# Patient Record
Sex: MALE | Race: WHITE
[De-identification: names, ages, dates, MRNs, and addresses within clinical notes are randomized per-mention and may not be internally consistent; named-entity substitution may affect disease eponyms.]

---

## 2020-12-16 ENCOUNTER — HOSPITAL ENCOUNTER (INPATIENT)
Dept: HOSPITAL 47 - EC | Age: 85
LOS: 2 days | Discharge: HOME HEALTH | DRG: 682 | End: 2020-12-18
Attending: HOSPITALIST | Admitting: HOSPITALIST
Payer: MEDICARE

## 2020-12-16 DIAGNOSIS — Z79.01: ICD-10-CM

## 2020-12-16 DIAGNOSIS — G93.41: ICD-10-CM

## 2020-12-16 DIAGNOSIS — E87.2: ICD-10-CM

## 2020-12-16 DIAGNOSIS — Z96.659: ICD-10-CM

## 2020-12-16 DIAGNOSIS — G30.1: ICD-10-CM

## 2020-12-16 DIAGNOSIS — I71.4: ICD-10-CM

## 2020-12-16 DIAGNOSIS — I48.19: ICD-10-CM

## 2020-12-16 DIAGNOSIS — Z79.899: ICD-10-CM

## 2020-12-16 DIAGNOSIS — G89.29: ICD-10-CM

## 2020-12-16 DIAGNOSIS — I25.2: ICD-10-CM

## 2020-12-16 DIAGNOSIS — I34.1: ICD-10-CM

## 2020-12-16 DIAGNOSIS — I10: ICD-10-CM

## 2020-12-16 DIAGNOSIS — E87.5: ICD-10-CM

## 2020-12-16 DIAGNOSIS — N17.0: Primary | ICD-10-CM

## 2020-12-16 DIAGNOSIS — E78.5: ICD-10-CM

## 2020-12-16 DIAGNOSIS — F02.80: ICD-10-CM

## 2020-12-16 DIAGNOSIS — Z79.1: ICD-10-CM

## 2020-12-16 DIAGNOSIS — Z90.81: ICD-10-CM

## 2020-12-16 DIAGNOSIS — Z87.01: ICD-10-CM

## 2020-12-16 DIAGNOSIS — Z87.891: ICD-10-CM

## 2020-12-16 DIAGNOSIS — E86.0: ICD-10-CM

## 2020-12-16 DIAGNOSIS — I25.10: ICD-10-CM

## 2020-12-16 DIAGNOSIS — Z79.82: ICD-10-CM

## 2020-12-16 DIAGNOSIS — Z20.828: ICD-10-CM

## 2020-12-16 LAB
ALBUMIN SERPL-MCNC: 3.7 G/DL (ref 3.5–5)
ALP SERPL-CCNC: 88 U/L (ref 38–126)
ALT SERPL-CCNC: 22 U/L (ref 4–49)
AMYLASE SERPL-CCNC: 67 U/L (ref 30–110)
ANION GAP SERPL CALC-SCNC: 6 MMOL/L
APTT BLD: 22.4 SEC (ref 22–30)
AST SERPL-CCNC: 46 U/L (ref 17–59)
BASOPHILS # BLD AUTO: 0.1 K/UL (ref 0–0.2)
BASOPHILS NFR BLD AUTO: 1 %
BUN SERPL-SCNC: 58 MG/DL (ref 9–20)
CALCIUM SPEC-MCNC: 9.6 MG/DL (ref 8.4–10.2)
CHLORIDE SERPL-SCNC: 98 MMOL/L (ref 98–107)
CK SERPL-CCNC: 319 U/L (ref 55–170)
CO2 SERPL-SCNC: 27 MMOL/L (ref 22–30)
DIGOXIN SERPL-MCNC: 1.5 NG/ML
EOSINOPHIL # BLD AUTO: 0.1 K/UL (ref 0–0.7)
EOSINOPHIL NFR BLD AUTO: 1 %
ERYTHROCYTE [DISTWIDTH] IN BLOOD BY AUTOMATED COUNT: 4.32 M/UL (ref 4.3–5.9)
ERYTHROCYTE [DISTWIDTH] IN BLOOD: 15.1 % (ref 11.5–15.5)
GLUCOSE SERPL-MCNC: 104 MG/DL (ref 74–99)
HCT VFR BLD AUTO: 41.2 % (ref 39–53)
HGB BLD-MCNC: 13.3 GM/DL (ref 13–17.5)
INR PPP: 1.3 (ref ?–1.2)
LIPASE SERPL-CCNC: 146 U/L (ref 23–300)
LYMPHOCYTES # SPEC AUTO: 1.1 K/UL (ref 1–4.8)
LYMPHOCYTES NFR SPEC AUTO: 14 %
MCH RBC QN AUTO: 30.8 PG (ref 25–35)
MCHC RBC AUTO-ENTMCNC: 32.3 G/DL (ref 31–37)
MCV RBC AUTO: 95.5 FL (ref 80–100)
MONOCYTES # BLD AUTO: 0.7 K/UL (ref 0–1)
MONOCYTES NFR BLD AUTO: 9 %
NEUTROPHILS # BLD AUTO: 5.8 K/UL (ref 1.3–7.7)
NEUTROPHILS NFR BLD AUTO: 73 %
PH UR: 6 [PH] (ref 5–8)
PLATELET # BLD AUTO: 348 K/UL (ref 150–450)
POTASSIUM SERPL-SCNC: 5.9 MMOL/L (ref 3.5–5.1)
PROT SERPL-MCNC: 7.7 G/DL (ref 6.3–8.2)
PT BLD: 12.7 SEC (ref 9–12)
SODIUM SERPL-SCNC: 131 MMOL/L (ref 137–145)
SP GR UR: 1.01 (ref 1–1.03)
UROBILINOGEN UR QL STRIP: <2 MG/DL (ref ?–2)
WBC # BLD AUTO: 8 K/UL (ref 3.8–10.6)
WBC # UR AUTO: 6 /HPF (ref 0–5)

## 2020-12-16 PROCEDURE — 85025 COMPLETE CBC W/AUTO DIFF WBC: CPT

## 2020-12-16 PROCEDURE — 36415 COLL VENOUS BLD VENIPUNCTURE: CPT

## 2020-12-16 PROCEDURE — 70551 MRI BRAIN STEM W/O DYE: CPT

## 2020-12-16 PROCEDURE — 99285 EMERGENCY DEPT VISIT HI MDM: CPT

## 2020-12-16 PROCEDURE — 70450 CT HEAD/BRAIN W/O DYE: CPT

## 2020-12-16 PROCEDURE — 81001 URINALYSIS AUTO W/SCOPE: CPT

## 2020-12-16 PROCEDURE — 71046 X-RAY EXAM CHEST 2 VIEWS: CPT

## 2020-12-16 PROCEDURE — 84484 ASSAY OF TROPONIN QUANT: CPT

## 2020-12-16 PROCEDURE — 94760 N-INVAS EAR/PLS OXIMETRY 1: CPT

## 2020-12-16 PROCEDURE — 94640 AIRWAY INHALATION TREATMENT: CPT

## 2020-12-16 PROCEDURE — 83690 ASSAY OF LIPASE: CPT

## 2020-12-16 PROCEDURE — 93880 EXTRACRANIAL BILAT STUDY: CPT

## 2020-12-16 PROCEDURE — 80053 COMPREHEN METABOLIC PANEL: CPT

## 2020-12-16 PROCEDURE — 82550 ASSAY OF CK (CPK): CPT

## 2020-12-16 PROCEDURE — 93005 ELECTROCARDIOGRAM TRACING: CPT

## 2020-12-16 PROCEDURE — 82150 ASSAY OF AMYLASE: CPT

## 2020-12-16 PROCEDURE — 85730 THROMBOPLASTIN TIME PARTIAL: CPT

## 2020-12-16 PROCEDURE — 85610 PROTHROMBIN TIME: CPT

## 2020-12-16 PROCEDURE — 80061 LIPID PANEL: CPT

## 2020-12-16 PROCEDURE — 87635 SARS-COV-2 COVID-19 AMP PRB: CPT

## 2020-12-16 PROCEDURE — 74176 CT ABD & PELVIS W/O CONTRAST: CPT

## 2020-12-16 PROCEDURE — 96361 HYDRATE IV INFUSION ADD-ON: CPT

## 2020-12-16 PROCEDURE — 80164 ASSAY DIPROPYLACETIC ACD TOT: CPT

## 2020-12-16 PROCEDURE — 80162 ASSAY OF DIGOXIN TOTAL: CPT

## 2020-12-16 PROCEDURE — 96360 HYDRATION IV INFUSION INIT: CPT

## 2020-12-16 PROCEDURE — 80048 BASIC METABOLIC PNL TOTAL CA: CPT

## 2020-12-16 RX ADMIN — LATANOPROST SCH: 50 SOLUTION OPHTHALMIC at 22:30

## 2020-12-16 RX ADMIN — CYCLOBENZAPRINE HYDROCHLORIDE SCH MG: 10 TABLET, FILM COATED ORAL at 22:30

## 2020-12-16 NOTE — CT
EXAMINATION TYPE: CT abdomen pelvis wo con

 

DATE OF EXAM: 12/16/2020

 

HISTORY: Weakness and pain.

 

CT DLP: 618.2 mGycm.  Automated Exposure Control for Dose Reduction was Utilized.

 

TECHNIQUE:  CT scan of the abdomen and pelvis is performed without oral or IV contrast.

 

COMPARISON: CT abdomen and pelvis December 28, 2016

 

FINDINGS:  Within the limitations of a non-contrast study, the following observations are made.

 

LUNG BASES: Moderate to severe left atrial dilatation is present. Calcification or surgical change at
 level of mitral valve redemonstrated. Mild cardiomegaly. Coronary artery calcification again seen. P
ectus deformity redemonstrated.

 

LIVER/GB: Punctate 1 to 2 mm calcifications centrally in the liver on coronal image 49 noted unchange
d from prior study.

 

PANCREAS: No significant abnormality is seen.

 

SPLEEN: Normal spleen not seen. Stable small partially calcified left upper quadrant lesion image 22 
could reflect residual splenosis.

 

ADRENALS: No significant abnormality is seen.

 

KIDNEYS: No renal stones or hydronephrosis is present bilaterally. Suprapubic catheter noted in bladd
er with moderate concentric wall thickening on current study.

 

BOWEL: Moderate amount of fecal prominence in the right and transverse colon. Prominent diverticula i
n the left and sigmoid colon. No CT evidence for acute diverticulitis. No suspicious small or large b
owel dilatation. Moderate focal fecal prominence in the rectum.

 

GENITAL ORGANS: Prostate gland upper limits of normal in size.

 

LYMPH NODES: No greater than 1cm abdominal or pelvic lymph nodes are appreciated.

 

OSSEOUS STRUCTURES: Moderate axial joint space loss and spurring of both hips. Multilevel interspace 
narrowing and spurring greatest at L2-L3 level left aspect with endplate sclerosis.

 

OTHER: Atherosclerotic and aneurysmal aorta extending into iliac branch vessels. Interval progression
. AAA measures up to 4.7 cm AP diameter image 46 up from 3.7 cm prior study. There is additional incr
eased size aneurysm right common iliac artery measuring 4.4 cm AP by 4.7 cm transversely axial image 
55. Additional ectasia and aneurysmal change of the left-sided iliac artery with distal left common i
liac artery measuring up to 3.0 cm transverse image 62.

 

Stable small fat-containing bilateral inguinal hernia.

 

IMPRESSION: Moderate proximal colonic and rectal fecal stasis. Overall nonobstructive bowel gas patte
rn. New suprapubic Dougherty catheter with moderate concentric wall thickening and bladder, cannot exclud
e underlying acute sinusitis, correlate clinically. Otherwise no acute findings clearly seen. Worseni
ng aneurysmal and atherosclerotic abdominal aorta extending into iliac arteries bilaterally noted.

## 2020-12-16 NOTE — ED
General Adult HPI





- General


Chief complaint: Altered Mental Status


Stated complaint: weakness


Time Seen by Provider: 12/16/20 13:22


Source: patient, EMS, RN notes reviewed


Mode of arrival: EMS


Limitations: altered mental status, physical limitation





- History of Present Illness


Initial comments: 





Patient is a pleasant 85-year-old male presenting to the emergency department by

EMS.  Patient is a very poor historian and not quite clear why he is here.  Ziggy leonardo denies any complaints at this time.  Patient states he did have chest 

discomfort earlier and this why they sent him in when questioned several times. 

Patient states is gone at this time.  Patient did admit to some abdominal 

discomfort during exam however states this was not the discomfort that he had 

earlier.  Nursing report states that patient did have some weakness and 

headache.  Patient denies any weakness or headache.  Patient states he has 

chronic bilateral leg weakness from back problems that are chronic and 

unchanged.





- Related Data


                                Home Medications











 Medication  Instructions  Recorded  Confirmed


 


Aspirin EC [Ecotrin Low Dose] 81 mg PO DAILY 12/28/16 12/28/16


 


Divalproex ER [Depakote ER] 1,500 mg PO HS 12/28/16 12/28/16


 


FLUoxetine HCL [PROzac] 20 mg PO DAILY 12/28/16 12/28/16


 


Lovastatin [Mevacor] 40 mg PO HS 12/28/16 12/28/16


 


Metoprolol Tartrate [Lopressor] 12.5 mg PO DAILY 12/28/16 12/28/16


 


Multivitamin [Men's Multi-Vitamin] 1 tab PO DAILY 12/28/16 12/28/16


 


Naproxen 500 mg PO BID PRN 12/28/16 12/28/16











                                    Allergies











Allergy/AdvReac Type Severity Reaction Status Date / Time


 


No Known Allergies Allergy   Verified 12/28/16 15:46














Review of Systems


ROS Statement: 


Those systems with pertinent positive or pertinent negative responses have been 

documented in the HPI.





ROS Other: All systems not noted in ROS Statement are negative.


Constitutional: Denies: fever


Eyes: Denies: eye pain


ENT: Denies: ear pain


Respiratory: Denies: cough


Cardiovascular: Reports: as per HPI, chest pain


Endocrine: Denies: fatigue


Gastrointestinal: Reports: as per HPI


Genitourinary: Denies: dysuria


Musculoskeletal: Reports: as per HPI


Skin: Denies: rash


Neurological: Reports: as per HPI





Past Medical History


Past Medical History: Chest Pain / Angina, Hyperlipidemia, Hypertension, 

Myocardial Infarction (MI), Mitral Valve Prolapse (MVP)


History of Any Multi-Drug Resistant Organisms: None Reported


Past Surgical History: Orthopedic Surgery


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


Past Psychological History: No Psychological Hx Reported


Past Alcohol Use History: None Reported


Past Drug Use History: None Reported





General Exam


Limitations: altered mental status, physical limitation


General appearance: alert, in no apparent distress


Head exam: Present: atraumatic


Eye exam: Present: normal appearance, PERRL, EOMI


ENT exam: Present: normal oropharynx


Neck exam: Present: normal inspection


Respiratory exam: Present: normal lung sounds bilaterally


Cardiovascular Exam: Present: regular rate, irregular rhythm


  ** Expanded


Peripheral pulses: 2+: Radial (R), Radial (L), Dorsalis Pedis (R), Dorsalis 

Pedis (L)


GI/Abdominal exam: Present: soft, tenderness (Moderate left upper and left lower

quadrant tenderness), normal bowel sounds.  Absent: distended, guarding, 

rebound, rigid, pulsatile mass


Extremities exam: Present: normal inspection


Neurological exam: Present: alert


  ** Expanded


Neurological exam: Present: protecting the airway


Speech: Present: fluid speech


Motor strength exam: RUE: 5, LUE: 5, RLE: 3 (States chronic, good strength with 

plantar and dorsiflexion of the feet), LLE: 3 (States chronic, good strength 

with plantar and dorsiflexion of the feet)


Eye Response: (4) open spontaneously


Motor Response: (6) obeys commands


Verbal Response: (4) confused conversation


Psychiatric exam: Present: normal affect, normal mood


Skin exam: Present: normal color





Course


                                   Vital Signs











  12/16/20 12/16/20 12/16/20





  13:19 13:31 15:34


 


Temperature 98.3 F  


 


Pulse Rate 61 72 72


 


Respiratory 18  18





Rate   


 


Blood Pressure 128/75 131/85 


 


O2 Sat by Pulse 92 L 97 96





Oximetry   














EKG Findings





- EKG Comments:


EKG Findings:: A. fib with rate of 71.  .  .  .  Normal 

axis.  Normal QRS.  No acute ST change.





Medical Decision Making





- Medical Decision Making





Patient reevaluated and updated.  Case discussed with Dr. Seo, who will 

admit covering for Dr. Crawford





- Lab Data


Result diagrams: 


                                 12/16/20 13:43





                                 12/16/20 13:43


                                   Lab Results











  12/16/20 12/16/20 12/16/20 Range/Units





  13:43 13:43 13:43 


 


WBC  8.0    (3.8-10.6)  k/uL


 


RBC  4.32    (4.30-5.90)  m/uL


 


Hgb  13.3    (13.0-17.5)  gm/dL


 


Hct  41.2    (39.0-53.0)  %


 


MCV  95.5    (80.0-100.0)  fL


 


MCH  30.8    (25.0-35.0)  pg


 


MCHC  32.3    (31.0-37.0)  g/dL


 


RDW  15.1    (11.5-15.5)  %


 


Plt Count  348    (150-450)  k/uL


 


MPV  7.1    


 


Neutrophils %  73    %


 


Lymphocytes %  14    %


 


Monocytes %  9    %


 


Eosinophils %  1    %


 


Basophils %  1    %


 


Neutrophils #  5.8    (1.3-7.7)  k/uL


 


Lymphocytes #  1.1    (1.0-4.8)  k/uL


 


Monocytes #  0.7    (0-1.0)  k/uL


 


Eosinophils #  0.1    (0-0.7)  k/uL


 


Basophils #  0.1    (0-0.2)  k/uL


 


PT   12.7 H   (9.0-12.0)  sec


 


INR   1.3 H   (<1.2)  


 


APTT   22.4   (22.0-30.0)  sec


 


Sodium    131 L  (137-145)  mmol/L


 


Potassium    5.9 H  (3.5-5.1)  mmol/L


 


Chloride    98  ()  mmol/L


 


Carbon Dioxide    27  (22-30)  mmol/L


 


Anion Gap    6  mmol/L


 


BUN    58 H  (9-20)  mg/dL


 


Creatinine    1.63 H  (0.66-1.25)  mg/dL


 


Est GFR (CKD-EPI)AfAm    44  (>60 ml/min/1.73 sqM)  


 


Est GFR (CKD-EPI)NonAf    38  (>60 ml/min/1.73 sqM)  


 


Glucose    104 H  (74-99)  mg/dL


 


Calcium    9.6  (8.4-10.2)  mg/dL


 


Total Bilirubin    1.0  (0.2-1.3)  mg/dL


 


AST    46  (17-59)  U/L


 


ALT    22  (4-49)  U/L


 


Alkaline Phosphatase    88  ()  U/L


 


Creatine Kinase    319 H  ()  U/L


 


Troponin I     (0.000-0.034)  ng/mL


 


Total Protein    7.7  (6.3-8.2)  g/dL


 


Albumin    3.7  (3.5-5.0)  g/dL


 


Amylase    67  ()  U/L


 


Lipase    146  ()  U/L


 


Urine Color     


 


Urine Appearance     (Clear)  


 


Urine pH     (5.0-8.0)  


 


Ur Specific Gravity     (1.001-1.035)  


 


Urine Protein     (Negative)  


 


Urine Glucose (UA)     (Negative)  


 


Urine Ketones     (Negative)  


 


Urine Blood     (Negative)  


 


Urine Nitrite     (Negative)  


 


Urine Bilirubin     (Negative)  


 


Urine Urobilinogen     (<2.0)  mg/dL


 


Ur Leukocyte Esterase     (Negative)  


 


Urine WBC     (0-5)  /hpf


 


Urine Bacteria     (None)  /hpf


 


Urine Mucus     (None)  /hpf


 


Digoxin     ng/mL


 


Valproic Acid     ug/mL














  12/16/20 12/16/20 12/16/20 Range/Units





  13:43 13:43 13:52 


 


WBC     (3.8-10.6)  k/uL


 


RBC     (4.30-5.90)  m/uL


 


Hgb     (13.0-17.5)  gm/dL


 


Hct     (39.0-53.0)  %


 


MCV     (80.0-100.0)  fL


 


MCH     (25.0-35.0)  pg


 


MCHC     (31.0-37.0)  g/dL


 


RDW     (11.5-15.5)  %


 


Plt Count     (150-450)  k/uL


 


MPV     


 


Neutrophils %     %


 


Lymphocytes %     %


 


Monocytes %     %


 


Eosinophils %     %


 


Basophils %     %


 


Neutrophils #     (1.3-7.7)  k/uL


 


Lymphocytes #     (1.0-4.8)  k/uL


 


Monocytes #     (0-1.0)  k/uL


 


Eosinophils #     (0-0.7)  k/uL


 


Basophils #     (0-0.2)  k/uL


 


PT     (9.0-12.0)  sec


 


INR     (<1.2)  


 


APTT     (22.0-30.0)  sec


 


Sodium     (137-145)  mmol/L


 


Potassium     (3.5-5.1)  mmol/L


 


Chloride     ()  mmol/L


 


Carbon Dioxide     (22-30)  mmol/L


 


Anion Gap     mmol/L


 


BUN     (9-20)  mg/dL


 


Creatinine     (0.66-1.25)  mg/dL


 


Est GFR (CKD-EPI)AfAm     (>60 ml/min/1.73 sqM)  


 


Est GFR (CKD-EPI)NonAf     (>60 ml/min/1.73 sqM)  


 


Glucose     (74-99)  mg/dL


 


Calcium     (8.4-10.2)  mg/dL


 


Total Bilirubin     (0.2-1.3)  mg/dL


 


AST     (17-59)  U/L


 


ALT     (4-49)  U/L


 


Alkaline Phosphatase     ()  U/L


 


Creatine Kinase     ()  U/L


 


Troponin I  0.022    (0.000-0.034)  ng/mL


 


Total Protein     (6.3-8.2)  g/dL


 


Albumin     (3.5-5.0)  g/dL


 


Amylase     ()  U/L


 


Lipase     ()  U/L


 


Urine Color    Light Yellow  


 


Urine Appearance    Clear  (Clear)  


 


Urine pH    6.0  (5.0-8.0)  


 


Ur Specific Gravity    1.010  (1.001-1.035)  


 


Urine Protein    Negative  (Negative)  


 


Urine Glucose (UA)    Negative  (Negative)  


 


Urine Ketones    Negative  (Negative)  


 


Urine Blood    Negative  (Negative)  


 


Urine Nitrite    Negative  (Negative)  


 


Urine Bilirubin    Negative  (Negative)  


 


Urine Urobilinogen    <2.0  (<2.0)  mg/dL


 


Ur Leukocyte Esterase    Small H  (Negative)  


 


Urine WBC    6 H  (0-5)  /hpf


 


Urine Bacteria    Rare H  (None)  /hpf


 


Urine Mucus    Rare H  (None)  /hpf


 


Digoxin   1.5   ng/mL


 


Valproic Acid   <10.0   ug/mL














- Radiology Data


Radiology results: report reviewed (Computed tomography scan abdomen and pelvis 

does show colonic and rectal fecal stasis.  Nonobstructive pattern.  Dougherty 

catheter with concentric wall thickening.  Otherwise no acute findings.  Worseni

ng aneurysmal and atherosclerotic abdominal aortic to iliac arteries bilateral 

noted.  CT brain shows)





Disposition


Clinical Impression: 


 Dehydration, Chest pain





Disposition: ADMITTED AS IP TO THIS HOSP


Is patient prescribed a controlled substance at d/c from ED?: No


Referrals: 


Nick Crawford MD [Primary Care Provider] - 1-2 days


Decision Time: 16:04

## 2020-12-16 NOTE — XR
EXAMINATION TYPE: XR chest 2V

 

DATE OF EXAM: 12/16/2020

 

COMPARISON: 12/28/2016

 

HISTORY: Chest pain

 

TECHNIQUE:

 

FINDINGS: Heart is top normal in size. There is some diffuse bilateral pneumonia. This is predominant
ly interstitial pneumonia. There are chest leads. There is no heart failure. There are no hilar joão
s. Mediastinum is normal. Bony thorax is intact.

 

IMPRESSION: There is new bilateral diffuse pneumonia compared to old exam.

## 2020-12-16 NOTE — CT
EXAMINATION TYPE: CT brain wo con

 

DATE OF EXAM: 12/16/2020

 

HISTORY: Weakness and pain.

 

CT DLP: 1231.4 mGycm.  Automated Exposure Control for Dose Reduction was Utilized.

 

TECHNIQUE: CT scan of the head is performed without contrast.

 

COMPARISON: None.

 

FINDINGS:   There is no acute intracranial hemorrhage or midline shift identified. There is diffuse v
entricular and sulcal prominence consistent with diffuse age-related cerebral atrophy.  There is low-
attenuation in the periventricular white matter consistent with chronic small vessel ischemic change.
 Nasal septum deviated to right of midline. Evidence of prior right-sided paranasal sinus surgery as 
retention cysts or polyps with mucosal thickening in ethmoid sinuses bilaterally. Vascular calcificat
ion distal internal carotid arteries.   

 

IMPRESSION:  No acute intracranial hemorrhage or midline shift.  There is moderate to borderline adva
nced diffuse age-related cerebral atrophy and mild to moderate chronic small vessel ischemic change n
oted.

## 2020-12-17 LAB
ANION GAP SERPL CALC-SCNC: 3 MMOL/L
BUN SERPL-SCNC: 37 MG/DL (ref 9–20)
CALCIUM SPEC-MCNC: 9.4 MG/DL (ref 8.4–10.2)
CHLORIDE SERPL-SCNC: 100 MMOL/L (ref 98–107)
CHOLEST SERPL-MCNC: 208 MG/DL (ref ?–200)
CO2 SERPL-SCNC: 31 MMOL/L (ref 22–30)
GLUCOSE SERPL-MCNC: 94 MG/DL (ref 74–99)
HDLC SERPL-MCNC: 39 MG/DL (ref 40–60)
INR PPP: 1.28 (ref 0.9–1.11)
LDLC SERPL CALC-MCNC: 155 MG/DL (ref 0–99)
POTASSIUM SERPL-SCNC: 5.2 MMOL/L (ref 3.5–5.1)
PT BLD: 13.6 SEC (ref 9.9–11.9)
SODIUM SERPL-SCNC: 134 MMOL/L (ref 137–145)
TRIGL SERPL-MCNC: 68 MG/DL (ref ?–150)

## 2020-12-17 RX ADMIN — IPRATROPIUM BROMIDE AND ALBUTEROL SULFATE PRN ML: .5; 3 SOLUTION RESPIRATORY (INHALATION) at 12:02

## 2020-12-17 RX ADMIN — PANTOPRAZOLE SODIUM SCH MG: 40 TABLET, DELAYED RELEASE ORAL at 08:49

## 2020-12-17 RX ADMIN — Medication SCH MG: at 08:48

## 2020-12-17 RX ADMIN — CYCLOBENZAPRINE HYDROCHLORIDE SCH MG: 10 TABLET, FILM COATED ORAL at 20:44

## 2020-12-17 RX ADMIN — TROSPIUM CHLORIDE SCH MG: 20 TABLET, FILM COATED ORAL at 20:44

## 2020-12-17 RX ADMIN — METOPROLOL TARTRATE SCH MG: 25 TABLET, FILM COATED ORAL at 20:44

## 2020-12-17 RX ADMIN — METOPROLOL TARTRATE SCH MG: 25 TABLET, FILM COATED ORAL at 08:47

## 2020-12-17 RX ADMIN — CYCLOBENZAPRINE HYDROCHLORIDE SCH MG: 10 TABLET, FILM COATED ORAL at 08:45

## 2020-12-17 RX ADMIN — THERA TABS SCH EACH: TAB at 08:48

## 2020-12-17 RX ADMIN — PANTOPRAZOLE SODIUM SCH MG: 40 TABLET, DELAYED RELEASE ORAL at 20:44

## 2020-12-17 RX ADMIN — LATANOPROST SCH DROPS: 50 SOLUTION OPHTHALMIC at 20:45

## 2020-12-17 RX ADMIN — CYCLOBENZAPRINE HYDROCHLORIDE SCH MG: 10 TABLET, FILM COATED ORAL at 17:03

## 2020-12-17 RX ADMIN — TROSPIUM CHLORIDE SCH MG: 20 TABLET, FILM COATED ORAL at 08:50

## 2020-12-17 RX ADMIN — BUDESONIDE SCH: 1 SUSPENSION RESPIRATORY (INHALATION) at 21:40

## 2020-12-17 RX ADMIN — CEFAZOLIN SCH MLS/HR: 330 INJECTION, POWDER, FOR SOLUTION INTRAMUSCULAR; INTRAVENOUS at 17:02

## 2020-12-17 RX ADMIN — BUDESONIDE SCH: 1 SUSPENSION RESPIRATORY (INHALATION) at 10:10

## 2020-12-17 RX ADMIN — DILTIAZEM HYDROCHLORIDE SCH MG: 180 CAPSULE, COATED, EXTENDED RELEASE ORAL at 08:52

## 2020-12-17 NOTE — P.CRDCN
History of Present Illness


History of present illness: 





HISTORY OF PRESENTING ILLNESS


This is a pleasant 85-year-old  male past medical history significant 

for abdominal and iliac aneurysms, dyslipidemia, hypertension, noncritical 

coronary artery disease and paroxysmal atrial fibrillation on Coumadin for 

thromboembolic protection.  He follows in the office with Dr. Cast. We have 

been asked to see in consultation for chest pain.  The patient himself is seen 

and examined sitting up on the edge of the bed in no acute distress.  He is 

complaining of lower back discomfort.  He is having memory lapses and is unable 

to tell his wife he came to the hospital.  He does give brief history of being 

recently hospitalized however he is unsure of the year or why exactly he is at 

the hospital.  We spoke with his wife Cass on the telephone along with his 

daughter Ree who was over at the time.  They state that he was discharged 

from [facility] on December 3 after being treated for pneumonia, 

A. fib and respiratory distress.  They state he was intubated.  Upon discharge 

he was given home oxygen and according to the family seemed to normal mentation 

for a couple of days.  The daughter states after being home for a couple of days

he started to seem more and more confused.  In the last 48 hours he has not been

eating or drinking and was becoming stubborn and obstinate with his family.  The

wife also states he has been complaining of a headache and clutching the right 

side of his head.  She also feels as though he is overall generally weak but 

seemed to be more weak on the right side and she was having to help him walk.  

Visiting nurse was at the house and recommended hospitalization.  EKG on arrival

revealed atrial fibrillation with controlled ventricular rate.  Chest x-ray 

reveals bilateral diffuse pneumonia.  CT of the abdomen and pelvis reveals 

moderate proximal colonic and rectal fecal stasis, Dougherty catheter with moderate 

concentric bladder wall thickening and worsening aneurysmal and atherosclerotic 

abdominal aorta extending into the iliac arteries bilaterally.  CT of the brain 

is negative for acute intracranial hemorrhage or midline shift with moderate 

diffuse age-related cerebral atrophy and mild to moderate chronic small vessel 

ischemic changes.  Laboratory data reviewed, CBC unremarkable, INR 1.2, sodium 

134, potassium 5.2, creatinine on admission 1.63 down to 1.12 this morning, 

troponin negative 3,  and HDL 39.  Current daily cardiac medications 

include lisinopril 20 mg daily, digoxin 125 g daily, Lasix 40 mg twice a day, 

diltiazem 180 mg daily, Lopressor 25 mg twice a day, aspirin 81 mg daily, 

warfarin and daily potassium supplementation.  Most recent echocardiogram o

btained in the office February 2020 revealed impaired LV systolic function with 

ejection fraction 40-45%, mild concentric LVH, mildly dilated right ventricle, 

severely dilated left atrium, aortic valve is calcified with thickened leaflets,

moderate to severe MR, severe mitral annual calcification and moderately 

thickened mitral leaflets. Telemetry tracings indicate intermittent episodes of 

rapid ventricular rates up to 150, currently 97. 


 


REVIEW OF SYSTEMS


At the time of my exam:


CONSTITUTIONAL: Denies fever or chills.


CARDIOVASCULAR: Denies chest pain, shortness of breath, orthopnea, PND or 

palpitations.


RESPIRATORY: Denies cough. 


GASTROINTESTINAL: Denies abdominal pain, diarrhea, constipation, nausea or 

vomiting.


MUSCULOSKELETAL: Complains of lower back.


NEUROLOGIC: Denies numbness, tingling or weakness.


ENDOCRINE: Denies fatigue, weight change,  polydipsia or polyurina.


GENITOURINARY: Denies burning, hematuria or urgency with micturation.


HEMATOLOGIC: Denies history of anemia or bleeding. 





PHYSICAL EXAMINATION


Blood pressure 156/88 heart rate 81 afebrile and maintaining oxygen saturation 

on nasal cannula.


CONSTITUTIONAL: No apparent distress. 


HEENT: Head is normocephalic. Pupils are equal, round. Sclerae anicteric. Mucous

membranes of the mouth are moist.  No JVD. No carotid bruit.


CHEST EXAMINATION: Diminished bilaterally. No rales, wheezes or rhonchi. No 

chest wall tenderness is noted on palpation or with deep breathing. 


HEART EXAMINATION: Irregular rate and rhythm. S1, S2 heard. Systolic ejection 

murmur at the left sternal border, no gallops or rub.


ABDOMEN: Soft, nontender. Positive bowel sounds.


EXTREMITIES: 2+ peripheral pulses, no lower extremity edema and no calf 

tenderness.


NEUROLOGIC EXAMINATION: Patient is awake, alert to self and place. 





ASSESSMENT


Altered mental status


Paroxysmal atrial fibrillation on coumadin


Hyperkalemia


Acute kidney injury


Dyslipidemia


Hypertension


Lower back pain


Suprapubic catheter


Recent pneumonia requiring mechanical ventilation


Iliac aneurysms bilaterally


Abdominal aortic aneurysm


Non-obstructive coronary artery disease, cath 2011 50% ostial LAD





PLAN


According to the family his altered mental status is new since previous 

hospitalization along with some right sided weakness. Recommend further 

neurology evaluation. 


Hold daily potassium supplementation secondary to hyperkalemia. 


For rate control, continue lopressor and diltiazem. Hold digoxin for now. 


Request records from previous hospitalization at [facility] for review. 


Continue warfarin for thromboembolic protection. 


In terms of his aneurysms, the CT films were reviewed by Dr. Sexton and do no 

appear to amendable to endovascular repair, suggest outpatient vascular surgery 

evaluation when he is medically stable. 


Thank you kindly for this consultation. 





Nurse Practitioner note has been reviewed, I agree with a documented findings 

and plan of care.  Patient was seen and examined.











Past Medical History


Past Medical History: Chest Pain / Angina, Hyperlipidemia, Hypertension, 

Myocardial Infarction (MI), Mitral Valve Prolapse (MVP)


Last Myocardial Infarction Date:: unknown


History of Any Multi-Drug Resistant Organisms: None Reported


Past Surgical History: Orthopedic Surgery


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


Past Anesthesia/Blood Transfusion Reactions: No Reported Reaction


Past Psychological History: No Psychological Hx Reported


Smoking Status: Former smoker


Past Drug Use History: None Reported





Medications and Allergies


                                Home Medications











 Medication  Instructions  Recorded  Confirmed  Type


 


Aspirin EC [Ecotrin Low Dose] 81 mg PO DAILY 12/28/16 12/16/20 History


 


Metoprolol Tartrate [Lopressor] 25 mg PO BID 12/28/16 12/16/20 History


 


Multivitamin [Men's Multi-Vitamin] 1 tab PO DAILY 12/28/16 12/16/20 History


 


Acetaminophen [Tylenol] 1,000 mg PO Q8H PRN 12/16/20 12/16/20 History


 


Budesonide [Pulmicort] 1 mg INHALATION RT-BID@0700,1900 12/16/20 12/16/20 

History


 


Cyclobenzaprine HCl 5 mg PO TID 12/16/20 12/16/20 History


 


Digoxin See Taper PO DAILY 12/16/20 12/16/20 History


 


Diltiazem Cd [Cardizem CD] 180 mg PO DAILY 12/16/20 12/16/20 History


 


FLUoxetine HCL [PROzac] 10 mg PO BID 12/16/20 12/16/20 History


 


Furosemide [Lasix] 40 mg PO BID 12/16/20 12/16/20 History


 


Ipratropium-Albuterol Nebulize 3 ml INHALATION RT-Q6H PRN 12/16/20 12/16/20 

History





[Duoneb 0.5 mg-3 mg/3 ml Soln]    


 


Latanoprost/Pf [Latanoprost 0.005% 1 drop BOTH EYES HS 12/16/20 12/16/20 History





Eye Drop]    


 


Magnesium Oxide [Magox 400] 400 mg PO DAILY 12/16/20 12/16/20 History


 


Melatonin 10.5 mg PO HS 12/16/20 12/16/20 History


 


Meloxicam 15 mg PO DAILY 12/16/20 12/16/20 History


 


Omeprazole [PriLOSEC] 20 mg PO BID 12/16/20 12/16/20 History


 


Potassium Chloride [Klor-Con 20] 20 meq PO BID-W/MEALS 12/16/20 12/16/20 History


 


Trospium Chloride 20 mg PO BID 12/16/20 12/16/20 History


 


Warfarin [Coumadin] 1 mg PO TUTHSA 12/16/20 12/16/20 History


 


Warfarin [Coumadin] 2 mg PO SUMOWEFR 12/16/20 12/16/20 History


 


lisinopriL 20 mg PO DAILY 12/16/20 12/16/20 History








                                    Allergies











Allergy/AdvReac Type Severity Reaction Status Date / Time


 


hydromorphone [From Dilaudid] AdvReac  Unknown Verified 12/16/20 17:04


 


shellfish derived [Shrimp] AdvReac  Unknown Verified 12/16/20 17:04














Physical Exam


Vitals: 


                                   Vital Signs











  Temp Pulse Pulse Resp BP BP Pulse Ox


 


 12/17/20 08:00  98.0 F   81  16   156/88  96


 


 12/17/20 03:01  97.8 F   69  18   124/85  99


 


 12/16/20 19:38  97.8 F   67  20   99/65  98


 


 12/16/20 19:01  98.3 F  92   18  103/71   100


 


 12/16/20 16:15   89   18  113/81   100


 


 12/16/20 15:34   72   18    96


 


 12/16/20 13:31   72    131/85   97


 


 12/16/20 13:19  98.3 F  61   18  128/75   92 L








                                Intake and Output











 12/16/20 12/17/20 12/17/20





 22:59 06:59 14:59


 


Output Total  1100 


 


Balance  -1100 


 


Output:   


 


  Urine  1100 


 


Other:   


 


  Voiding Method Indwelling Catheter  


 


  Weight 74.843 kg  














Results





                                 12/16/20 13:43





                                 12/17/20 05:43


                                 Cardiac Enzymes











  12/16/20 12/16/20 12/16/20 Range/Units





  13:43 13:43 17:25 


 


AST  46    (17-59)  U/L


 


Troponin I   0.022  0.025  (0.000-0.034)  ng/mL














  12/16/20 Range/Units





  20:02 


 


AST   (17-59)  U/L


 


Troponin I  0.026  (0.000-0.034)  ng/mL








                                   Coagulation











  12/16/20 12/17/20 Range/Units





  13:43 05:43 


 


PT  12.7 H  13.6 H  (9.0-12.0)  sec


 


APTT  22.4   (22.0-30.0)  sec








                                     Lipids











  12/17/20 Range/Units





  05:43 


 


Triglycerides  68  (<150)  mg/dL


 


Cholesterol  208 H  (<200)  mg/dL


 


HDL Cholesterol  39 L  (40-60)  mg/dL








                                       CBC











  12/16/20 Range/Units





  13:43 


 


WBC  8.0  (3.8-10.6)  k/uL


 


RBC  4.32  (4.30-5.90)  m/uL


 


Hgb  13.3  (13.0-17.5)  gm/dL


 


Hct  41.2  (39.0-53.0)  %


 


Plt Count  348  (150-450)  k/uL








                          Comprehensive Metabolic Panel











  12/16/20 12/17/20 Range/Units





  13:43 05:43 


 


Sodium  131 L  134 L  (137-145)  mmol/L


 


Potassium  5.9 H  5.2 H  (3.5-5.1)  mmol/L


 


Chloride  98  100  ()  mmol/L


 


Carbon Dioxide  27  31 H  (22-30)  mmol/L


 


BUN  58 H  37 H  (9-20)  mg/dL


 


Creatinine  1.63 H  1.12  (0.66-1.25)  mg/dL


 


Glucose  104 H  94  (74-99)  mg/dL


 


Calcium  9.6  9.4  (8.4-10.2)  mg/dL


 


AST  46   (17-59)  U/L


 


ALT  22   (4-49)  U/L


 


Alkaline Phosphatase  88   ()  U/L


 


Total Protein  7.7   (6.3-8.2)  g/dL


 


Albumin  3.7   (3.5-5.0)  g/dL








                               Current Medications











Generic Name Dose Route Start Last Admin





  Trade Name Freq  PRN Reason Stop Dose Admin


 


Acetaminophen  1,000 mg  12/16/20 21:05 





  Acetaminophen Tab 500 Mg Tab  PO  





  Q8H PRN  





  Moderate Pain  


 


Albuterol/Ipratropium  3 ml  12/16/20 21:05 





  Ipratropium-Albuterol 3 Ml Neb  INHALATION  





  RT-Q6H PRN  





  Shortness Of Breath  


 


Aspirin  325 mg  12/17/20 09:00  12/17/20 08:47





  Aspirin 325 Mg Tab  PO   325 mg





  DAILY BOUBACAR   Administration


 


Budesonide  1 mg  12/17/20 07:00 





  Budesonide 1 Mg/2 Ml Nebu  INHALATION  





  RT-BID@0700,1900 [facility]  


 


Cyclobenzaprine HCl  5 mg  12/16/20 22:00  12/17/20 08:45





  Cyclobenzaprine 10 Mg Tab  PO   5 mg





  TID BOUBACAR   Administration


 


Diltiazem HCl  180 mg  12/17/20 09:00  12/17/20 08:52





  Diltiazem Cd 180 Mg Cap.Er.24h  PO   180 mg





  DAILY BOUBACAR   Administration


 


Fluoxetine HCl  10 mg  12/17/20 09:00  12/17/20 08:51





  Fluoxetine Hcl 10 Mg Cap  PO   10 mg





  BID BOUBACAR   Administration


 


Furosemide  40 mg  12/17/20 09:00  12/17/20 08:47





  Furosemide 40 Mg Tab  PO   40 mg





  BID BOUBACAR   Administration


 


Latanoprost  1 drops  12/16/20 21:00  12/16/20 22:30





  Latanoprost 0.005% Ophth Drops 2.5 Ml Btl  BOTH EYES   Not Given





  HS BOUBACAR  


 


Lisinopril  20 mg  12/17/20 09:00  12/17/20 08:48





  Lisinopril 20 Mg Tab  PO   20 mg





  DAILY BOUBACAR   Administration


 


Magnesium Oxide  400 mg  12/17/20 09:00  12/17/20 08:48





  Magnesium Oxide 400 Mg Tab  PO   400 mg





  DAILY BOUBACAR   Administration


 


Melatonin  10 mg  12/16/20 21:15  12/16/20 22:30





  Melatonin 5 Mg Tablet  PO   10 mg





  HS [facility]   Administration


 


Meloxicam  15 mg  12/17/20 09:00  12/17/20 08:53





  Meloxicam 7.5 Mg Tab  PO   15 mg





  DAILY BOUBACAR   Administration


 


Metoprolol Tartrate  25 mg  12/17/20 09:00  12/17/20 08:47





  Metoprolol Tartrate 25 Mg Tab  PO   25 mg





  BID BOUBACAR   Administration


 


Miscellaneous Information  0 each  12/16/20 21:34 





  Warfarin Per Pharmacy  MISCELLANE  





  AS DIRECTED PRN  





  ANTICOAG  


 


Multivitamins  1 each  12/17/20 09:00  12/17/20 08:48





  Multivitamins, Thera 1 Each Tab  PO   1 each





  DAILY BOUBACAR   Administration


 


Nitroglycerin  0.4 mg  12/16/20 16:04 





  Nitroglycerin Sl Tabs 0.4 Mg Tab  SUBLINGUAL  





  Q5M PRN  





  Chest Pain  


 


Pantoprazole Sodium  40 mg  12/17/20 09:00  12/17/20 08:49





  Pantoprazole 40 Mg Tablet  PO   40 mg





  BID BOUBACAR   Administration


 


Potassium Chloride  20 meq  12/17/20 07:30  12/17/20 09:04





  Potassium Chloride Er 20 Meq Tab.Er  PO   Not Given





  BID-W/MEALS BOUBACAR  


 


Trospium  20 mg  12/17/20 09:00  12/17/20 08:50





  Trospium Chloride 20 Mg Tablet  PO   20 mg





  BID BOUBACAR   Administration








                                Intake and Output











 12/16/20 12/17/20 12/17/20





 22:59 06:59 14:59


 


Output Total  1100 


 


Balance  -1100 


 


Output:   


 


  Urine  1100 


 


Other:   


 


  Voiding Method Indwelling Catheter  


 


  Weight 74.843 kg  








                                        





                                 12/16/20 13:43 





                                 12/17/20 05:43

## 2020-12-18 VITALS
SYSTOLIC BLOOD PRESSURE: 106 MMHG | DIASTOLIC BLOOD PRESSURE: 61 MMHG | RESPIRATION RATE: 20 BRPM | TEMPERATURE: 97.9 F | HEART RATE: 81 BPM

## 2020-12-18 LAB
ANION GAP SERPL CALC-SCNC: 6.2 MMOL/L (ref 4–12)
BUN SERPL-SCNC: 32 MG/DL (ref 9–27)
BUN/CREAT SERPL: 32 RATIO (ref 12–20)
CALCIUM SPEC-MCNC: 9.5 MG/DL (ref 8.7–10.3)
CHLORIDE SERPL-SCNC: 101 MMOL/L (ref 96–109)
CHOLEST SERPL-MCNC: 193 MG/DL (ref 0–200)
CO2 SERPL-SCNC: 27.8 MMOL/L (ref 21.6–31.8)
GLUCOSE SERPL-MCNC: 99 MG/DL (ref 70–110)
HDLC SERPL-MCNC: 39 MG/DL (ref 40–60)
INR PPP: 1.46 (ref 0.9–1.11)
LDLC SERPL CALC-MCNC: 139.6 MG/DL (ref 0–131)
POTASSIUM SERPL-SCNC: 5 MMOL/L (ref 3.5–5.5)
PT BLD: 15.3 SEC (ref 9.9–11.9)
SODIUM SERPL-SCNC: 135 MMOL/L (ref 135–145)
TRIGL SERPL-MCNC: 72 MG/DL (ref 0–149)
VLDLC SERPL CALC-MCNC: 14.4 MG/DL (ref 5–40)

## 2020-12-18 RX ADMIN — TROSPIUM CHLORIDE SCH MG: 20 TABLET, FILM COATED ORAL at 07:45

## 2020-12-18 RX ADMIN — CEFAZOLIN SCH MLS/HR: 330 INJECTION, POWDER, FOR SOLUTION INTRAMUSCULAR; INTRAVENOUS at 07:46

## 2020-12-18 RX ADMIN — METOPROLOL TARTRATE SCH MG: 25 TABLET, FILM COATED ORAL at 07:44

## 2020-12-18 RX ADMIN — IPRATROPIUM BROMIDE AND ALBUTEROL SULFATE PRN ML: .5; 3 SOLUTION RESPIRATORY (INHALATION) at 08:30

## 2020-12-18 RX ADMIN — DILTIAZEM HYDROCHLORIDE SCH MG: 180 CAPSULE, COATED, EXTENDED RELEASE ORAL at 07:45

## 2020-12-18 RX ADMIN — CYCLOBENZAPRINE HYDROCHLORIDE SCH MG: 10 TABLET, FILM COATED ORAL at 16:46

## 2020-12-18 RX ADMIN — PANTOPRAZOLE SODIUM SCH MG: 40 TABLET, DELAYED RELEASE ORAL at 07:43

## 2020-12-18 RX ADMIN — CYCLOBENZAPRINE HYDROCHLORIDE SCH MG: 10 TABLET, FILM COATED ORAL at 07:43

## 2020-12-18 RX ADMIN — BUDESONIDE SCH MG: 1 SUSPENSION RESPIRATORY (INHALATION) at 08:30

## 2020-12-18 RX ADMIN — THERA TABS SCH EACH: TAB at 07:44

## 2020-12-18 RX ADMIN — Medication SCH MG: at 07:44

## 2020-12-18 NOTE — US
EXAMINATION TYPE: US carotid duplex BILAT

 

DATE OF EXAM: 12/18/2020

 

COMPARISON: CT Brain

 

CLINICAL HISTORY: possible TIA. Patient stated had MI.

 

EXAM MEASUREMENTS: 

 

RIGHT:  Peak Systolic Velocity (PSV) cm/sec

----- Right CCA:  57.3  

----- Right ICA:  49.0     

----- Right ECA:  70.3   

ICA/CCA ratio:  0.9    

 

RIGHT:  End Diastole cm/sec

----- Right CCA:  15.5   

----- Right ICA:  13.5      

----- Right ECA:  12.9     

 

LEFT:  Peak Systolic Velocity (PSV) cm/sec

----- Left CCA:  54.0  

----- Left ICA:  47.9   

----- Left ECA:  72.6  

ICA/CCA ratio:  0.9  

 

LEFT:  End Diastole cm/sec

----- Left CCA:  13.9  

----- Left ICA:  18.2   

----- Left ECA:  0.0 

 

VERTEBRALS (direction of flow):

Right Vertebral: Antegrade

Left Vertebral: Antegrade

 

Rhythm:  Arrhythmia

 

Mild mixed plaque seen at bilateral carotid bifurcation and PSV is wnl bilaterally.

This appears to be worse on the left. Significant flow-limiting stenosis is not identified.

 

 

IMPRESSION:  

1. Atheromatous plaquing present greater on the left. Significant flow-limiting stenosis is not ident
ified.   

 

 

Criteria for Assigning % of Stenosis / Diameter reduction

(Estimation based on the indirect measurements of the internal carotid artery velocities (ICA PSV).

1.  Normal (no stenosis)=ICA PSV < 125 cm/s: ratio < 2.0: ICA EDV<40 cm/s.

2. Less than 50% stenosis=ICA PSV < 125 cm/s: ratio < 2.0: ICA EDV<40 cm/s.

3.  50 to 69% stenosis=ICA PSV of 125 to 230 cm/s: ration 2.0 ? 4.0: ICA EDV  cm/s.

4.  Greater than 70% stenosis to near occlusion= ICA PSV > 230 cm/s: ratio > 4.0: ICA EDV > 100 cm/s.
 

5.  Near occlusion= ICA PSV velocities may be low or undetectable: variable ratio and ICA EDV.

6.  Total occlusion=unable to detect flow.

## 2020-12-18 NOTE — P.CNNES
History of Present Illness


Consult date: 12/18/20


Requesting physician: Robert Seo


Reason for Consult: Leans to right


History of Present Illness: 





Patient is a 85-year-old male came to the hospital on 12/16/2022 at 1:16 PM by 

ambulance for no clear reasons.  It was reported patient has altered mental 

status and weakness.  Patient tells me that he has heart problems, had MI on 

11/11/2020, and was admitted to [facility] for 20 days.  Once he came out of

the hospital he was feeling very weak.  He walks slow.  Neurology was consulted 

because it was noted that he is leaning to the right side.  Patient denies any 

stroke symptoms, slurred speech facial droop, focal weakness, diplopia, 

headaches.





Patient's vitals on arrival blood pressure 128/75, pulse rate 61 temperature 

98.3.  CT head showed no acute intracranial hemorrhage or midline shift.  There 

is moderate to borderline advanced diffuse age-related cerebral atrophy and mild

to moderate chronic small vessel ischemic change noted.  CT abdomen and pelvis 

showed moderate proximal colonic and rectal fecal stasis.  Overall 

nonobstructive bowel gas pattern.  New suprapubic Dougherty catheter with moderate 

concentric wall thickening of bladder, cannot exclude underlying acute cystitis,

correlate clinically.  Otherwise no acute findings clearly seen.  Worsening 

aneurysmal and atherosclerotic abdominal aorta extending into ideal arteries 

bilaterally noted.  The aneurysm of abdominal aorta measures 4.7 cm.  EKG shows 

atrial fibrillation.  Chest x-ray showed new bilateral diffuse pneumonia 

compared to old exam.  Carotid Doppler showed atheromatous plaquing present 

greater on the left.  Significant flow limiting stenosis is not identified.  MRI

of the brain revealed atrophy with periventricular white matter ischemic type 

changes.  Susceptibility artifact left frontal region.  Patient's blood test on 

arrival showed BUN 58, now improved to 32, creatinine was 1.63, now 1.0.  Sodium

was 131 on arrival now 135.  Potassium was 5.9, now 5.0.  Patient's cholesterol 

is 208, , HDL 39 and triglycerides 68.  Corona virus negative.  Patient 

is on Coumadin for atrial fibrillation, most recent INR is 1.46








Review of Systems





As mentioned in HPI in detail.  All other review of systems completely 

unremarkable.  Patient has suprapubic catheter.  Patient has constipation.  De

nies nausea vomiting diarrhea.





Past Medical History


Past Medical History: Chest Pain / Angina, Hyperlipidemia, Hypertension, 

Myocardial Infarction (MI), Mitral Valve Prolapse (MVP)


Last Myocardial Infarction Date:: unknown


History of Any Multi-Drug Resistant Organisms: None Reported


Past Surgical History: Orthopedic Surgery


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


Past Anesthesia/Blood Transfusion Reactions: No Reported Reaction


Past Psychological History: No Psychological Hx Reported


Smoking Status: Former smoker


Past Drug Use History: None Reported





Medications and Allergies


                                Home Medications











 Medication  Instructions  Recorded  Confirmed  Type


 


Aspirin EC [Ecotrin Low Dose] 81 mg PO DAILY 12/28/16 12/16/20 History


 


Metoprolol Tartrate [Lopressor] 25 mg PO BID 12/28/16 12/16/20 History


 


Multivitamin [Men's Multi-Vitamin] 1 tab PO DAILY 12/28/16 12/16/20 History


 


Acetaminophen [Tylenol] 1,000 mg PO Q8H PRN 12/16/20 12/16/20 History


 


Budesonide [Pulmicort] 1 mg INHALATION RT-BID@0700,1900 12/16/20 12/16/20 

History


 


Cyclobenzaprine HCl 5 mg PO TID 12/16/20 12/16/20 History


 


Diltiazem Cd [Cardizem CD] 180 mg PO DAILY 12/16/20 12/16/20 History


 


FLUoxetine HCL [PROzac] 10 mg PO BID 12/16/20 12/16/20 History


 


Ipratropium-Albuterol Nebulize 3 ml INHALATION RT-Q6H PRN 12/16/20 12/16/20 

History





[Duoneb 0.5 mg-3 mg/3 ml Soln]    


 


Latanoprost/Pf [Latanoprost 0.005% 1 drop BOTH EYES HS 12/16/20 12/16/20 History





Eye Drop]    


 


Magnesium Oxide [Magox 400] 400 mg PO DAILY 12/16/20 12/16/20 History


 


Meloxicam 15 mg PO DAILY 12/16/20 12/16/20 History


 


Omeprazole [PriLOSEC] 20 mg PO BID 12/16/20 12/16/20 History


 


Trospium Chloride 20 mg PO BID 12/16/20 12/16/20 History


 


Atorvastatin [Lipitor] 40 mg PO HS #30 tab 12/18/20  Rx


 


Melatonin 6 mg PO HS #0 12/18/20 12/16/20 Rx


 


Spironolactone [Aldactone] 25 mg PO DAILY #30 tablet 12/18/20  Rx


 


Warfarin [Coumadin] 2 mg PO HS #0 12/18/20 12/16/20 Rx


 


lisinopriL 20 mg PO HS #0 12/18/20 12/16/20 Rx








                                    Allergies











Allergy/AdvReac Type Severity Reaction Status Date / Time


 


hydromorphone [From Dilaudid] AdvReac  Unknown Verified 12/16/20 17:04


 


shellfish derived [Shrimp] AdvReac  Unknown Verified 12/16/20 17:04














Physical Examination





- Vital Signs


Vital Signs: 


                                   Vital Signs











  Temp Pulse Pulse Resp BP Pulse Ox


 


 12/18/20 08:46   68    


 


 12/18/20 08:33   68     99


 


 12/18/20 08:00     17  


 


 12/18/20 07:40  98.6 F   83  17  142/86  97


 


 12/18/20 00:54  97.4 F L   80  15  115/76  95


 


 12/17/20 19:14     16  


 


 12/17/20 19:13  97.5 F L   110 H  16  117/75  97








                                Intake and Output











 12/18/20 12/18/20 12/18/20





 06:59 14:59 22:59


 


Output Total 1150  


 


Balance -1150  


 


Output:   


 


  Urine 1150  


 


Other:   


 


  Voiding Method  Indwelling Catheter 














On examination patient is an elderly  male, in no distress.  Patient is

 alert and awake, and fairly oriented.  He knows that he is in [facility] in the hospital but does not know the name.  He states it is December 

and the year is 2021.  He knows name of the current and the next president.  

Knows his date of birth.  Speech and language functions are normal.  Attention 

and concentration, fund of knowledge is somewhat limited.  On cranial 

examination pupils are round and reactive to light, visual fields are full to 

confrontation, extraocular muscles are intact with no nystagmus.  Face is 

symmetric, tongue protrudes to the midline.  Palatal elevation sensation normal,

 hearing and shoulder shrug normal.  On muscle strength testing there is no 

pronator drift and the strength is normal in arms and legs distally and 

proximally reflexes are 1+ and plantars downgoing sensory touch is equal.  No 

ataxia for finger-to-nose testing, tone and bulk of muscles normal.  Gait 

deferred.  There is no obvious bruit, S1 and S2 audible, abdomen soft nontender,

 chest is clear.  No peripheral edema.





Results





- Laboratory Findings


CBC and BMP: 


                                 12/16/20 13:43





                                 12/18/20 06:19


Abnormal Lab Findings: 


                                  Abnormal Labs











  12/16/20 12/16/20 12/16/20





  13:43 13:43 13:52


 


PT  12.7 H  


 


INR  1.3 H  


 


Sodium   131 L 


 


Potassium   5.9 H 


 


Carbon Dioxide   


 


BUN   58 H 


 


Creatinine   1.63 H 


 


BUN/Creatinine Ratio   


 


Glucose   104 H 


 


Creatine Kinase   319 H 


 


Cholesterol   


 


LDL Cholesterol, Calc   


 


HDL Cholesterol   


 


Ur Leukocyte Esterase    Small H


 


Urine WBC    6 H


 


Urine Bacteria    Rare H


 


Urine Mucus    Rare H














  12/17/20 12/17/20 12/18/20





  05:43 05:43 06:19


 


PT   13.6 H  15.3 H


 


INR   1.28 H  1.46 H


 


Sodium  134 L  


 


Potassium  5.2 H  


 


Carbon Dioxide  31 H  


 


BUN  37 H  


 


Creatinine   


 


BUN/Creatinine Ratio   


 


Glucose   


 


Creatine Kinase   


 


Cholesterol  208 H  


 


LDL Cholesterol, Calc  155 H  


 


HDL Cholesterol  39 L  


 


Ur Leukocyte Esterase   


 


Urine WBC   


 


Urine Bacteria   


 


Urine Mucus   














  12/18/20





  06:19


 


PT 


 


INR 


 


Sodium 


 


Potassium 


 


Carbon Dioxide 


 


BUN  32.0 H


 


Creatinine 


 


BUN/Creatinine Ratio  32.00 H


 


Glucose 


 


Creatine Kinase 


 


Cholesterol 


 


LDL Cholesterol, Calc  139.6 H


 


HDL Cholesterol  39.0 L


 


Ur Leukocyte Esterase 


 


Urine WBC 


 


Urine Bacteria 


 


Urine Mucus 














Assessment and Plan


Assessment: 





* Altered mental status, likely due metabolic encephalopathy due to to 

  dehydration, renal insufficiency, and electrolyte imbalance.  Mentation now 

  back to normal.


* Renal insufficiency, improved


* Hypertension


* Atrial fibrillation on anticoagulation.


* Dyslipidemia


* Suprapubic catheter


* Chronic low back pain


* Abdominal aortic aneurysm


Plan: 





* Patient's neurological examination is nonfocal.  No signs of stroke or TIA.


* Patient is on Coumadin, INR subtherapeutic 1.46.  Target INR 2-3.  Consider 

  bridging until INR therapeutic.


* Patient already has on workup performed, essentially negative.


* Neurologically clear for discharge with the above recommendations.

## 2020-12-18 NOTE — MR
EXAMINATION TYPE: MR brain wo con

 

DATE OF EXAM: 12/18/2020

 

COMPARISON: 12/16/2020 CT brain

 

HISTORY: Rt sided weakness

 

CONTRAST:  Performed utilizing 0 mL intravenous Gadavist gadolinium contrast.  

 

TECHNIQUE: Multiplanar, multiecho imaging on a 3.0 Shanelle magnet is performed through the brain.  Stud
y is performed within 24 hours of arrival to the hospital.

 

The craniovertebral junction is normal.  The pituitary is normal.  Susceptibility artifact in the lef
t frontal region limits adjacent brain evaluation.

 

Diffusion-weighted imaging is performed.  No abnormal hyperintensity is present to suggest an acute i
ntracranial infarct or acute ischemic change.

 

Periventricular white matter hyperintensity is present on T2 and inversion recovery weighted sequence
s likely related to microvascular ischemic change. A few subcortical white matter changes are evident
 within the frontal and parietal lobes. Findings are nonspecific but likely related to microvascular 
ischemic change. 

 

Ventricles and sulci are prominent for the patient age. Lateral ventricles appear prominent. Temporal
 horn dilatation is not evident however.

 

 

IMPRESSIONS:

1. Atrophy with periventricular white matter ischemic type changes.

2. Susceptibility artifact left frontal region.

3. No acute intracranial process

## 2020-12-18 NOTE — P.DS
Providers


Date of admission: 


12/16/20 16:05





Expected date of discharge: 12/18/20


Attending physician: 


Robert Seo





Consults: 





                                        





12/16/20 16:05


Consult Physician Urgent 


   Consulting Provider: Tremayne Zambrano


   Consult Reason/Comments: cp


   Do you want consulting provider notified?: Yes





12/17/20 16:16


Consult Physician Routine 


   Consulting Provider: Reema Cross


   Consult Reason/Comments: leans to right


   Do you want consulting provider notified?: Yes











Primary care physician: 


Nick Crawford





[facility] Course: 





Chief Complaint: right-sided weakness





History of presenting complaint:


This is a 85-year-old patient of Dr. Nick Crawford.  As per the EMS report the 

caregiver said that patient for last 2 days has been feeling weak on the right 

side.  Slight headache.  Some dizziness.  Patient does use 4 L of oxygen home.  

Patient not a good historian.  He thinks he may be feeling a bit numb on the 

right side.  No change in swallowing.  Speech doesn't appear to be different to 

him.  No change in vision.  Like stated patient not a good historian.


Admitted with altered mental status.  Seen by neurology.  Felt to be metabolic 

encephalopathy.  No stroke or TIA.  Also felt to be acute kidney injury 

including ATN.  Creatinine was 1.63.  Did come down to 1.0.  Medications 

adjusted.


Today-doing well.  Eating well.  Care was discussed with Dr. Conklin from 

neurology.  Okay for SC home..  Also spoke to .  Family would like 

the patient to come home.


Discussion and discharge planning more than 35 minutes





Consultation:


Dr. Sexton from cardiology


Dr. Conklin from neurology








Physical examination:


VITAL SIGNS: 97.9, 81, 20, 106/61, 91% room air


GENERAL: BMI 29.2, sitting up in a chair, comfortable.


EYES: Pupils equal.  Conjunctiva normal.


HEENT: External appearance of nose and ears normal, oral cavity grossly normal.


NECK: JVD not raised; masses not palpable.


HEART: First and second heart sounds are normal;  no edema.  


LUNGS: Respiratory rate normal; decreased breath sounds.  


ABDOMEN: Soft,  nontender, liver spleen not palpable, no masses palpable.  


PSYCH: [Patient can doubt the year the month this season but more difficulty 

with more detail questions l.  


NEUROLOGICAL: Cranial nerves grossly intact; no facial asymmetry,   power and 

sensation grossly intact. 








INVESTIGATIONS, reviewed in the clinical context:


December 18: Potassium 5 creatinine 1.0 


Coronavirus P/Cr-not detected INR 1.46


White count 8.0 hemoglobin 13.3 platelets 348


Potassium 5.9 bun 58 creatinine 1.63.  Repeat 5.2 and creatinine 1.12


Troponin I 0.022, 0.025, 0.026


 INR 1.28


Digoxin 1.5 valproic acid less than 10


EKG tracing personally reviewed by me-atrial fibrillation, rate 71


Chest x-ray film personally reviewed by me-bilateral scattered infiltrates


Computed tomography scan of the abdomen pelvis-moderate proximal colonic and 

rectal fecal stasis.  AAA-4.7 cm


Computed tomography scan of the brain-nothing acute, chronic changes





Assessment:


-Acute metabolic encephalopathy from acute kidney injury.


-Acute kidney injury likely ATN and prerenal, improving with fluids. 


-Hyperkalemia from acute kidney injury-improved


-Abdominal aortic aneurysm a 4.7 cm-follow with Dr. Partida vascular


-Persistent atrial fibrillation rate controlled


-Hyperlipidemia


-Essential hypertension


-Coronary artery disease with prior MI


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





Disposition:


Home








Patient Condition at Discharge: Stable





Plan - Discharge Summary


Discharge Rx Participant: No


New Discharge Prescriptions: 


New


   Spironolactone [Aldactone] 25 mg PO DAILY #30 tablet


   Atorvastatin [Lipitor] 40 mg PO HS #30 tab





Continue


   Multivitamin [Men's Multi-Vitamin] 1 tab PO DAILY


   Metoprolol Tartrate [Lopressor] 25 mg PO BID


   Aspirin EC [Ecotrin Low Dose] 81 mg PO DAILY


   Acetaminophen [Tylenol] 1,000 mg PO Q8H PRN


     PRN Reason: Moderate Pain


   Budesonide [Pulmicort] 1 mg INHALATION RT-BID@0700,1900


   Diltiazem Cd [Cardizem CD] 180 mg PO DAILY


   Ipratropium-Albuterol Nebulize [Duoneb 0.5 mg-3 mg/3 ml Soln] 3 ml INHALATION

RT-Q6H PRN


     PRN Reason: Shortness Of Breath


   Latanoprost/Pf [Latanoprost 0.005% Eye Drop] 1 drop BOTH EYES HS


   Magnesium Oxide [Magox 400] 400 mg PO DAILY


   Meloxicam 15 mg PO DAILY


   Omeprazole [PriLOSEC] 20 mg PO BID


   FLUoxetine HCL [PROzac] 10 mg PO BID


   Trospium Chloride 20 mg PO BID


   Cyclobenzaprine HCl 5 mg PO TID





Changed


   Warfarin [Coumadin] 2 mg PO HS #0


   lisinopriL 20 mg PO HS #0


   Melatonin 6 mg PO HS #0





Discontinued


   Furosemide [Lasix] 40 mg PO BID


   Potassium Chloride [Klor-Con 20] 20 meq PO BID-W/MEALS


   Warfarin [Coumadin] 1 mg PO TUTHSA


   Digoxin See Taper PO DAILY


Discharge Medication List





Aspirin EC [Ecotrin Low Dose] 81 mg PO DAILY 12/28/16 [History]


Metoprolol Tartrate [Lopressor] 25 mg PO BID 12/28/16 [History]


Multivitamin [Men's Multi-Vitamin] 1 tab PO DAILY 12/28/16 [History]


Acetaminophen [Tylenol] 1,000 mg PO Q8H PRN 12/16/20 [History]


Budesonide [Pulmicort] 1 mg INHALATION RT-BID@0700,1900 12/16/20 [History]


Cyclobenzaprine HCl 5 mg PO TID 12/16/20 [History]


Diltiazem Cd [Cardizem CD] 180 mg PO DAILY 12/16/20 [History]


FLUoxetine HCL [PROzac] 10 mg PO BID 12/16/20 [History]


Ipratropium-Albuterol Nebulize [Duoneb 0.5 mg-3 mg/3 ml Soln] 3 ml INHALATION 

RT-Q6H PRN 12/16/20 [History]


Latanoprost/Pf [Latanoprost 0.005% Eye Drop] 1 drop BOTH EYES HS 12/16/20 

[History]


Magnesium Oxide [Magox 400] 400 mg PO DAILY 12/16/20 [History]


Meloxicam 15 mg PO DAILY 12/16/20 [History]


Omeprazole [PriLOSEC] 20 mg PO BID 12/16/20 [History]


Trospium Chloride 20 mg PO BID 12/16/20 [History]


Atorvastatin [Lipitor] 40 mg PO HS #30 tab 12/18/20 [Rx]


Melatonin 6 mg PO HS #0 12/18/20 [Rx]


Spironolactone [Aldactone] 25 mg PO DAILY #30 tablet 12/18/20 [Rx]


Warfarin [Coumadin] 2 mg PO HS #0 12/18/20 [Rx]


lisinopriL 20 mg PO HS #0 12/18/20 [Rx]








Follow up Appointment(s)/Referral(s): 


Delfin Cast MD [STAFF PHYSICIAN] - 2 Weeks


[facility], [NON-STAFF] - 


Nick Crawford MD [Primary Care Provider] - 1-2 days


Patient Instructions/Handouts:  Chest Pain (DC), Dehydration (DC)


Activity/Diet/Wound Care/Special Instructions: 


bmp/inr  - 5 days





lisinopril is not a new dose- timing changed


Discharge Disposition: HOME WITH HOME HEALTH SERVICES

## 2020-12-18 NOTE — P.PN
Subjective





HISTORY OF PRESENTING ILLNESS


This is a pleasant 85-year-old  male past medical history significant 

for abdominal and iliac aneurysms, dyslipidemia, hypertension, noncritical 

coronary artery disease and paroxysmal atrial fibrillation on Coumadin for 

thromboembolic protection.  He follows in the office with Dr. Cast. We have 

been asked to see in consultation for chest pain.  The patient himself is seen 

and examined sitting up on the edge of the bed in no acute distress.  He is 

complaining of lower back discomfort.  He is having memory lapses and is unable 

to tell his wife he came to the hospital.  He does give brief history of being 

recently hospitalized however he is unsure of the year or why exactly he is at 

the hospital.  We spoke with his wife Cass on the telephone along with his 

daughter Ree who was over at the time.  They state that he was discharged 

from [facility] on December 3 after being treated for pneumonia, 

A. fib and respiratory distress.  They state he was intubated.  Upon discharge 

he was given home oxygen and according to the family seemed to normal mentation 

for a couple of days.  The daughter states after being home for a couple of days

he started to seem more and more confused.  In the last 48 hours he has not been

eating or drinking and was becoming stubborn and obstinate with his family.  The

wife also states he has been complaining of a headache and clutching the right 

side of his head.  She also feels as though he is overall generally weak but 

seemed to be more weak on the right side and she was having to help him walk.  

Visiting nurse was at the house and recommended hospitalization.  EKG on arrival

revealed atrial fibrillation with controlled ventricular rate.  Chest x-ray 

reveals bilateral diffuse pneumonia.  CT of the abdomen and pelvis reveals 

moderate proximal colonic and rectal fecal stasis, Dougherty catheter with moderate 

concentric bladder wall thickening and worsening aneurysmal and atherosclerotic 

abdominal aorta extending into the iliac arteries bilaterally.  CT of the brain 

is negative for acute intracranial hemorrhage or midline shift with moderate 

diffuse age-related cerebral atrophy and mild to moderate chronic small vessel 

ischemic changes.  Laboratory data reviewed, CBC unremarkable, INR 1.2, sodium 

134, potassium 5.2, creatinine on admission 1.63 down to 1.12 this morning, 

troponin negative 3,  and HDL 39.  Current daily cardiac medications 

include lisinopril 20 mg daily, digoxin 125 g daily, Lasix 40 mg twice a day, 

diltiazem 180 mg daily, Lopressor 25 mg twice a day, aspirin 81 mg daily, 

warfarin and daily potassium supplementation.  Most recent echocardiogram 

obtained in the office February 2020 revealed impaired LV systolic function with

ejection fraction 40-45%, mild concentric LVH, mildly dilated right ventricle, 

severely dilated left atrium, aortic valve is calcified with thickened leaflets,

moderate to severe MR, severe mitral annual calcification and moderately 

thickened mitral leaflets. Telemetry tracings indicate intermittent episodes of 

rapid ventricular rates up to 150, currently 97. 


 


12/18/2020


Patient was seen and examined sitting up in bed in no acute distress.  He 

continues to be confused at baseline.  He denies symptoms of chest pain or 

shortness of breath.  Records were reviewed from previous hospitalization.  It 

appears he was initially they're being treated for uncontrolled hypertension and

headache.  He underwent occipital nerve block.  He ended up with metabolic 

encephalopathy, respiratory acidosis and septic shock.  She was intubated for a 

short period of time.  He also developed aspiration pneumonia.  He was being 

treated for atrial fibrillation with rapid ventricular rate on IV Cardizem.  

Echocardiogram obtained on that admission revealed preserved LV systolic 

function with ejection fraction 50-55%.  Blood pressure currently 142/86 with 

heart rate of 68 afebrile maintaining oxygen saturation on nasal cannula.





PHYSICAL EXAMINATION


CONSTITUTIONAL: No apparent distress. 


HEENT: Head is normocephalic. Pupils are equal, round. Sclerae anicteric. Mucous

membranes of the mouth are moist.  No JVD. No carotid bruit.


CHEST EXAMINATION: Diminished bilaterally. No rales, wheezes or rhonchi. No 

chest wall tenderness is noted on palpation or with deep breathing. 


HEART EXAMINATION: Irregular rate and rhythm. S1, S2 heard. Systolic ejection 

murmur at the left sternal border, no gallops or rub.


EXTREMITIES: 2+ peripheral pulses, no lower extremity edema and no calf tendern

ess.





ASSESSMENT


Altered mental status


Paroxysmal atrial fibrillation on coumadin


Hyperkalemia


Acute kidney injury


Dyslipidemia


Hypertension


Lower back pain


Suprapubic catheter


Recent pneumonia requiring mechanical ventilation


Iliac aneurysms bilaterally


Abdominal aortic aneurysm


Non-obstructive coronary artery disease, cath 2011 50% ostial LAD





PLAN


Ongoing medical management.


No further cardiac concerns at this time.


We will follow along as needed, please follow-up on discharge with Dr. Cast.





Nurse Practitioner note has been reviewed, I agree with a documented findings 

and plan of care.  Patient was seen and examined.





Objective





- Vital Signs


Vital signs: 


                                   Vital Signs











Temp  98.6 F   12/18/20 07:40


 


Pulse  68   12/18/20 08:46


 


Resp  17   12/18/20 08:00


 


BP  142/86   12/18/20 07:40


 


Pulse Ox  99   12/18/20 08:33








                                 Intake & Output











 12/17/20 12/18/20 12/18/20





 18:59 06:59 18:59


 


Intake Total 600  


 


Output Total  1150 


 


Balance 600 -1150 


 


Intake:   


 


    


 


    Sodium Chloride 0.9% 1, 600  





    000 ml @ 75 mls/hr IV .   





    [id number] STA Rx#:[id number]   


 


Output:   


 


  Urine  1150 


 


Other:   


 


  Voiding Method  Indwelling Catheter Indwelling Catheter


 


  # Bowel Movements 1  














- Labs


CBC & Chem 7: 


                                 12/16/20 13:43





                                 12/17/20 05:43

## 2020-12-18 NOTE — P.HPIM
History of Present Illness


H&P Date: 12/17/20


Chief Complaint: right-sided weakness





History of presenting complaint:


This is a 85-year-old patient of Dr. Nick Crawford.  As per the EMS report the 

caregiver said that patient for last 2 days has been feeling weak on the right 

side.  Slight headache.  Some dizziness.  Patient does use 4 L of oxygen at 

home.  Patient not a good historian.  He thinks he may be feeling a bit numb on 

the right side.  No change in swallowing.  Speech doesn't appear to be different

to him.  No change in vision.  Like stated patient not a good historian.





Review of systems:


GEN.:  Tired


EYES: None


HEENT: None


NECK: None


RESPIRATORY: None


CARDIOVASCULAR: None


GASTROINTESTINAL: None


GENITOURINARY: None


MUSCULOSKELETAL: Joint pains


LYMPHATICS: None


HEMATOLOGICAL: None  


PSYCHIATRY: Forgetful


NEUROLOGICAL: Possible right-sided weakness





Past medical history to include:


Hyperlipidemia, hypertension, myocardial infarction, mitral valve prolapse a 

splenectomy.  Suprapubic catheter for a previous bladder problem





Social history:


Lives at home with his wife.  No smoking





Family history:


Patient cannot tell





Physical examination:


VITAL SIGNS: 98.3, 61, 18, 128/75, 92% on 4 L


GENERAL: BMI 29.2, sitting up in a chair, comfortable.


EYES: Pupils equal.  Conjunctiva normal.


HEENT: External appearance of nose and ears normal, oral cavity grossly normal.


NECK: JVD not raised; masses not palpable.


HEART: First and second heart sounds are normal;  no edema.  


LUNGS: Respiratory rate normal; decreased breath sounds.  


ABDOMEN: Soft,  nontender, liver spleen not palpable, no masses palpable.  


PSYCH: [Patient can doubt the year the month this season but more difficulty 

with more detail questions l.  


NEUROLOGICAL: Cranial nerves grossly intact; no facial asymmetry,   power and 

sensation grossly intact. 


LYMPHATICS: No lymph nodes palpable in the axilla and neck





INVESTIGATIONS, reviewed in the clinical context:


White count 8.0 hemoglobin 13.3 platelets 348


Potassium 5.9 bun 58 creatinine 1.63.  Repeat 5.2 and creatinine 1.12


Troponin I 0.022, 0.025, 0.026


 INR 1.28


Digoxin 1.5 valproic acid less than 10


EKG tracing personally reviewed by me-atrial fibrillation, rate 71


Chest x-ray film personally reviewed by me-bilateral scattered infiltrates


Computed tomography scan of the abdomen pelvis-moderate proximal colonic and 

rectal fecal stasis.  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


Computed tomography scan of the brain-nothing acute, chronic changes





Assessment:


-Patient was sent and off for the caregiver noticed that patient was weakness on

 the right side some headache some dizziness last 2 days.  No obvious 

neurological deficit can be elicited here.  Initial computed tomography scan of 

the brain is negative.


-Acute kidney injury likely prerenal, improving with fluids.  Note that the 

patient is on lisinopril meloxicam Lasix


-Hyperkalemia from acute kidney injury


-Abdominal aortic and is a 4.7 cm


-Persistent atrial fibrillation rate controlled


-Hyperlipidemia


-Essential hypertension


-Coronary artery disease with prior MI


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





Plan:


Patient be placed on aspirin, Lipitor.  Neurology be consulted.  Carotid 

Doppler.  Home medications to be resumed.  Continue Coumadin.  Follow INR.  DC 

meloxicam.  Gentle hydration.  Repeat labs in the morning.  Hold all potassium 

supplements.











Past Medical History


Past Medical History: Chest Pain / Angina, Hyperlipidemia, Hypertension, Jason

cardial Infarction (MI), Mitral Valve Prolapse (MVP)


Last Myocardial Infarction Date:: unknown


History of Any Multi-Drug Resistant Organisms: None Reported


Past Surgical History: Orthopedic Surgery


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


Past Anesthesia/Blood Transfusion Reactions: No Reported Reaction


Past Psychological History: No Psychological Hx Reported


Smoking Status: Former smoker


Past Drug Use History: None Reported





Medications and Allergies


                                Home Medications











 Medication  Instructions  Recorded  Confirmed  Type


 


Aspirin EC [Ecotrin Low Dose] 81 mg PO DAILY 12/28/16 12/16/20 History


 


Metoprolol Tartrate [Lopressor] 25 mg PO BID 12/28/16 12/16/20 History


 


Multivitamin [Men's Multi-Vitamin] 1 tab PO DAILY 12/28/16 12/16/20 History


 


Acetaminophen [Tylenol] 1,000 mg PO Q8H PRN 12/16/20 12/16/20 History


 


Budesonide [Pulmicort] 1 mg INHALATION RT-BID@0700,1900 12/16/20 12/16/20 

History


 


Cyclobenzaprine HCl 5 mg PO TID 12/16/20 12/16/20 History


 


Digoxin See Taper PO DAILY 12/16/20 12/16/20 History


 


Diltiazem Cd [Cardizem CD] 180 mg PO DAILY 12/16/20 12/16/20 History


 


FLUoxetine HCL [PROzac] 10 mg PO BID 12/16/20 12/16/20 History


 


Furosemide [Lasix] 40 mg PO BID 12/16/20 12/16/20 History


 


Ipratropium-Albuterol Nebulize 3 ml INHALATION RT-Q6H PRN 12/16/20 12/16/20 

History





[Duoneb 0.5 mg-3 mg/3 ml Soln]    


 


Latanoprost/Pf [Latanoprost 0.005% 1 drop BOTH EYES HS 12/16/20 12/16/20 History





Eye Drop]    


 


Magnesium Oxide [Magox 400] 400 mg PO DAILY 12/16/20 12/16/20 History


 


Melatonin 10.5 mg PO HS 12/16/20 12/16/20 History


 


Meloxicam 15 mg PO DAILY 12/16/20 12/16/20 History


 


Omeprazole [PriLOSEC] 20 mg PO BID 12/16/20 12/16/20 History


 


Potassium Chloride [Klor-Con 20] 20 meq PO BID-W/MEALS 12/16/20 12/16/20 History


 


Trospium Chloride 20 mg PO BID 12/16/20 12/16/20 History


 


Warfarin [Coumadin] 1 mg PO TUTHSA 12/16/20 12/16/20 History


 


Warfarin [Coumadin] 2 mg PO SUMOWEFR 12/16/20 12/16/20 History


 


lisinopriL 20 mg PO DAILY 12/16/20 12/16/20 History








                                    Allergies











Allergy/AdvReac Type Severity Reaction Status Date / Time


 


hydromorphone [From Dilaudid] AdvReac  Unknown Verified 12/16/20 17:04


 


shellfish derived [Shrimp] AdvReac  Unknown Verified 12/16/20 17:04














Physical Exam


Vitals: 


                                   Vital Signs











  Temp Pulse Pulse Resp BP BP Pulse Ox


 


 12/17/20 08:00  98.0 F   81  16   156/88  96


 


 12/17/20 03:01  97.8 F   69  18   124/85  99


 


 12/16/20 19:38  97.8 F   67  20   99/65  98


 


 12/16/20 19:01  98.3 F  92   18  103/71   100


 


 12/16/20 16:15   89   18  113/81   100


 


 12/16/20 15:34   72   18    96


 


 12/16/20 13:31   72    131/85   97


 


 12/16/20 13:19  98.3 F  61   18  128/75   92 L








                                Intake and Output











 12/16/20 12/17/20 12/17/20





 22:59 06:59 14:59


 


Output Total  1100 


 


Balance  -1100 


 


Output:   


 


  Urine  1100 


 


Other:   


 


  Voiding Method Indwelling Catheter  


 


  # Bowel Movements   1


 


  Weight 74.843 kg  














Results


CBC & Chem 7: 


                                 12/16/20 13:43





                                 12/17/20 05:43


Labs: 


                  Abnormal Lab Results - Last 24 Hours (Table)











  12/16/20 12/16/20 12/16/20 Range/Units





  13:43 13:43 13:52 


 


PT  12.7 H    (9.0-12.0)  sec


 


INR  1.3 H    (<1.2)  


 


Sodium   131 L   (137-145)  mmol/L


 


Potassium   5.9 H   (3.5-5.1)  mmol/L


 


Carbon Dioxide     (22-30)  mmol/L


 


BUN   58 H   (9-20)  mg/dL


 


Creatinine   1.63 H   (0.66-1.25)  mg/dL


 


Glucose   104 H   (74-99)  mg/dL


 


Creatine Kinase   319 H   ()  U/L


 


Cholesterol     (<200)  mg/dL


 


LDL Cholesterol, Calc     (0-99)  mg/dL


 


HDL Cholesterol     (40-60)  mg/dL


 


Ur Leukocyte Esterase    Small H  (Negative)  


 


Urine WBC    6 H  (0-5)  /hpf


 


Urine Bacteria    Rare H  (None)  /hpf


 


Urine Mucus    Rare H  (None)  /hpf














  12/17/20 12/17/20 Range/Units





  05:43 05:43 


 


PT   13.6 H  (9.0-12.0)  sec


 


INR   1.28 H  (<1.2)  


 


Sodium  134 L   (137-145)  mmol/L


 


Potassium  5.2 H   (3.5-5.1)  mmol/L


 


Carbon Dioxide  31 H   (22-30)  mmol/L


 


BUN  37 H   (9-20)  mg/dL


 


Creatinine    (0.66-1.25)  mg/dL


 


Glucose    (74-99)  mg/dL


 


Creatine Kinase    ()  U/L


 


Cholesterol  208 H   (<200)  mg/dL


 


LDL Cholesterol, Calc  155 H   (0-99)  mg/dL


 


HDL Cholesterol  39 L   (40-60)  mg/dL


 


Ur Leukocyte Esterase    (Negative)  


 


Urine WBC    (0-5)  /hpf


 


Urine Bacteria    (None)  /hpf


 


Urine Mucus    (None)  /hpf














Thrombosis Risk Factor Assmnt





- Choose All That Apply


Each Factor Represents 1 point: Obesity (BMI >25)


Each Risk Factor Represents 3 Points: Age 75 years or older


Thrombosis Risk Factor Assessment Total Risk Factor Score: 4


Thrombosis Risk Factor Assessment Level: Moderate Risk

## 2021-01-04 ENCOUNTER — HOSPITAL ENCOUNTER (OUTPATIENT)
Dept: HOSPITAL 47 - EC | Age: 86
Setting detail: OBSERVATION
LOS: 2 days | Discharge: HOME | End: 2021-01-06
Attending: HOSPITALIST | Admitting: HOSPITALIST
Payer: MEDICARE

## 2021-01-04 VITALS — BODY MASS INDEX: 27.4 KG/M2

## 2021-01-04 DIAGNOSIS — Z79.51: ICD-10-CM

## 2021-01-04 DIAGNOSIS — I50.22: ICD-10-CM

## 2021-01-04 DIAGNOSIS — I25.2: ICD-10-CM

## 2021-01-04 DIAGNOSIS — N18.30: ICD-10-CM

## 2021-01-04 DIAGNOSIS — G30.1: ICD-10-CM

## 2021-01-04 DIAGNOSIS — I34.1: ICD-10-CM

## 2021-01-04 DIAGNOSIS — I13.0: ICD-10-CM

## 2021-01-04 DIAGNOSIS — I34.0: ICD-10-CM

## 2021-01-04 DIAGNOSIS — E87.5: ICD-10-CM

## 2021-01-04 DIAGNOSIS — Z87.01: ICD-10-CM

## 2021-01-04 DIAGNOSIS — Z88.5: ICD-10-CM

## 2021-01-04 DIAGNOSIS — I71.4: ICD-10-CM

## 2021-01-04 DIAGNOSIS — Z91.013: ICD-10-CM

## 2021-01-04 DIAGNOSIS — Z79.82: ICD-10-CM

## 2021-01-04 DIAGNOSIS — Z79.01: ICD-10-CM

## 2021-01-04 DIAGNOSIS — G31.9: ICD-10-CM

## 2021-01-04 DIAGNOSIS — Z90.81: ICD-10-CM

## 2021-01-04 DIAGNOSIS — Z96.659: ICD-10-CM

## 2021-01-04 DIAGNOSIS — Z87.891: ICD-10-CM

## 2021-01-04 DIAGNOSIS — I48.19: Primary | ICD-10-CM

## 2021-01-04 DIAGNOSIS — E78.5: ICD-10-CM

## 2021-01-04 DIAGNOSIS — Z79.899: ICD-10-CM

## 2021-01-04 DIAGNOSIS — I25.10: ICD-10-CM

## 2021-01-04 DIAGNOSIS — T46.4X5A: ICD-10-CM

## 2021-01-04 LAB
ALBUMIN SERPL-MCNC: 3.8 G/DL (ref 3.5–5)
ALP SERPL-CCNC: 87 U/L (ref 38–126)
ALT SERPL-CCNC: 14 U/L (ref 4–49)
ANION GAP SERPL CALC-SCNC: 8 MMOL/L
APTT BLD: 25 SEC (ref 22–30)
AST SERPL-CCNC: 25 U/L (ref 17–59)
BASOPHILS # BLD AUTO: 0.2 K/UL (ref 0–0.2)
BASOPHILS NFR BLD AUTO: 2 %
BUN SERPL-SCNC: 34 MG/DL (ref 9–20)
CALCIUM SPEC-MCNC: 9.5 MG/DL (ref 8.4–10.2)
CHLORIDE SERPL-SCNC: 100 MMOL/L (ref 98–107)
CO2 SERPL-SCNC: 24 MMOL/L (ref 22–30)
EOSINOPHIL # BLD AUTO: 0.4 K/UL (ref 0–0.7)
EOSINOPHIL NFR BLD AUTO: 5 %
ERYTHROCYTE [DISTWIDTH] IN BLOOD BY AUTOMATED COUNT: 4.33 M/UL (ref 4.3–5.9)
ERYTHROCYTE [DISTWIDTH] IN BLOOD: 15.2 % (ref 11.5–15.5)
GLUCOSE SERPL-MCNC: 99 MG/DL (ref 74–99)
HCT VFR BLD AUTO: 40.3 % (ref 39–53)
HGB BLD-MCNC: 13.4 GM/DL (ref 13–17.5)
INR PPP: 1.2 (ref ?–1.2)
LYMPHOCYTES # SPEC AUTO: 2.1 K/UL (ref 1–4.8)
LYMPHOCYTES NFR SPEC AUTO: 22 %
MAGNESIUM SPEC-SCNC: 1.8 MG/DL (ref 1.6–2.3)
MCH RBC QN AUTO: 30.9 PG (ref 25–35)
MCHC RBC AUTO-ENTMCNC: 33.2 G/DL (ref 31–37)
MCV RBC AUTO: 93.1 FL (ref 80–100)
MONOCYTES # BLD AUTO: 0.6 K/UL (ref 0–1)
MONOCYTES NFR BLD AUTO: 7 %
NEUTROPHILS # BLD AUTO: 5.9 K/UL (ref 1.3–7.7)
NEUTROPHILS NFR BLD AUTO: 63 %
PLATELET # BLD AUTO: 446 K/UL (ref 150–450)
POTASSIUM SERPL-SCNC: 5.4 MMOL/L (ref 3.5–5.1)
PROT SERPL-MCNC: 7.2 G/DL (ref 6.3–8.2)
PT BLD: 12.2 SEC (ref 9–12)
SODIUM SERPL-SCNC: 132 MMOL/L (ref 137–145)
WBC # BLD AUTO: 9.3 K/UL (ref 3.8–10.6)

## 2021-01-04 PROCEDURE — 80048 BASIC METABOLIC PNL TOTAL CA: CPT

## 2021-01-04 PROCEDURE — 83735 ASSAY OF MAGNESIUM: CPT

## 2021-01-04 PROCEDURE — 70450 CT HEAD/BRAIN W/O DYE: CPT

## 2021-01-04 PROCEDURE — 80053 COMPREHEN METABOLIC PANEL: CPT

## 2021-01-04 PROCEDURE — 85730 THROMBOPLASTIN TIME PARTIAL: CPT

## 2021-01-04 PROCEDURE — 93306 TTE W/DOPPLER COMPLETE: CPT

## 2021-01-04 PROCEDURE — 84484 ASSAY OF TROPONIN QUANT: CPT

## 2021-01-04 PROCEDURE — 99285 EMERGENCY DEPT VISIT HI MDM: CPT

## 2021-01-04 PROCEDURE — 93005 ELECTROCARDIOGRAM TRACING: CPT

## 2021-01-04 PROCEDURE — 85025 COMPLETE CBC W/AUTO DIFF WBC: CPT

## 2021-01-04 PROCEDURE — 71046 X-RAY EXAM CHEST 2 VIEWS: CPT

## 2021-01-04 PROCEDURE — 96372 THER/PROPH/DIAG INJ SC/IM: CPT

## 2021-01-04 PROCEDURE — 36415 COLL VENOUS BLD VENIPUNCTURE: CPT

## 2021-01-04 PROCEDURE — 85610 PROTHROMBIN TIME: CPT

## 2021-01-04 PROCEDURE — 84443 ASSAY THYROID STIM HORMONE: CPT

## 2021-01-04 RX ADMIN — METOPROLOL TARTRATE SCH MG: 25 TABLET, FILM COATED ORAL at 21:57

## 2021-01-04 RX ADMIN — ATORVASTATIN CALCIUM SCH MG: 40 TABLET, FILM COATED ORAL at 21:55

## 2021-01-04 RX ADMIN — PANTOPRAZOLE SODIUM SCH MG: 40 TABLET, DELAYED RELEASE ORAL at 18:34

## 2021-01-04 RX ADMIN — ENOXAPARIN SODIUM SCH MG: 80 INJECTION SUBCUTANEOUS at 22:00

## 2021-01-04 RX ADMIN — BUDESONIDE SCH: 1 SUSPENSION RESPIRATORY (INHALATION) at 20:34

## 2021-01-04 RX ADMIN — LATANOPROST SCH: 50 SOLUTION OPHTHALMIC at 21:59

## 2021-01-04 RX ADMIN — Medication SCH MG: at 21:56

## 2021-01-04 RX ADMIN — TROSPIUM CHLORIDE SCH MG: 20 TABLET, FILM COATED ORAL at 21:57

## 2021-01-04 NOTE — XR
EXAMINATION TYPE: XR chest 2V

 

DATE OF EXAM: 1/4/2021

 

COMPARISON: 12/16/2020

 

HISTORY: 85-year-old male dysrhythmia

 

TECHNIQUE:  AP and lateral views

 

FINDINGS:  

Heart is mildly enlarged. Aorta and pulmonary vasculature within normal limits. Mild patchy interstit
ial density on the right. No pleural effusion.

 

 

IMPRESSION:  

Mild patchy interstitial density especially on the right. Correlate to exclude mild pulmonary vascula
r congestion or atypical pneumonias.

## 2021-01-04 NOTE — ED
Arrhythmia/Palpitations HPI





- General


Chief Complaint: Arrhythmia/Palpitations


Stated Complaint: Afib


Source: patient


Mode of arrival: wheelchair


Limitations: no limitations





- History of Present Illness


Initial Comments: 





Patient is an 85-year-old male with past medical history of A. fib, 

hypertension, hyperlipidemia who presents to the emergency department with 

reported palpitations.  Patient reports that he awoke around 7 AM with 

palpitations.  He does have a history of paroxysmal A. fib.  He does take 

Coumadin and he was just placed on this 2 weeks ago.  Also takes Lopressor.  

Patient is known to Dr. Zambrano.  States that in the past month he was 

hospitalized at [facility] where he "flatlined".  Patient denies any 

chest pain.  Does admit to a headache.  No visual changes.  No neck pain.  

Denies shortness of breath.  No fevers, chills or cough.  No other alleviating, 

precipitating or modifying factors





- Related Data


                                Home Medications











 Medication  Instructions  Recorded  Confirmed


 


Aspirin EC [Ecotrin Low Dose] 81 mg PO DAILY 12/28/16 01/04/21


 


Metoprolol Tartrate [Lopressor] 25 mg PO BID 12/28/16 01/04/21


 


Multivitamin [Men's Multi-Vitamin] 1 tab PO DAILY 12/28/16 01/04/21


 


Acetaminophen [Tylenol] 1,000 mg PO Q8H PRN 12/16/20 01/04/21


 


Budesonide [Pulmicort] 1 mg INHALATION RT-BID@0700,1900 12/16/20 01/04/21


 


Diltiazem Cd [Cardizem CD] 180 mg PO DAILY 12/16/20 01/04/21


 


Ipratropium-Albuterol Nebulize 3 ml INHALATION RT-QID PRN 12/16/20 01/04/21





[Duoneb 0.5 mg-3 mg/3 ml Soln]   


 


Latanoprost/Pf [Latanoprost 0.005% 1 drop BOTH EYES HS 12/16/20 01/04/21





Eye Drop]   


 


Magnesium Oxide [Magox 400] 400 mg PO DAILY 12/16/20 01/04/21


 


Meloxicam 15 mg PO DAILY 12/16/20 01/04/21


 


Omeprazole [PriLOSEC] 20 mg PO BID 12/16/20 01/04/21


 


Trospium Chloride 20 mg PO BID 12/16/20 01/04/21


 


Atorvastatin [Lipitor] 40 mg PO HS 01/04/21 01/04/21


 


Cyclobenzaprine [Flexeril] 5 mg PO TID PRN 01/04/21 01/04/21


 


FLUoxetine HCL [PROzac] 10 mg PO BID 01/04/21 01/04/21


 


lisinopriL 20 mg PO HS 01/04/21 01/04/21








                                  Previous Rx's











 Medication  Instructions  Recorded


 


Melatonin 6 mg PO HS #0 12/18/20


 


Spironolactone [Aldactone] 25 mg PO DAILY #30 tablet 12/18/20


 


Warfarin [Coumadin] 2 mg PO HS #0 12/18/20











                                    Allergies











Allergy/AdvReac Type Severity Reaction Status Date / Time


 


hydromorphone [From Dilaudid] AdvReac  Unknown Verified 01/04/21 15:11


 


shellfish derived [Shrimp] AdvReac  Unknown Verified 01/04/21 15:11














Review of Systems


ROS Statement: 


Those systems with pertinent positive or pertinent negative responses have been 

documented in the HPI.





ROS Other: All systems not noted in ROS Statement are negative.





Past Medical History


Past Medical History: Atrial Fibrillation, Chest Pain / Angina, Hyperlipidemia, 

Hypertension, Myocardial Infarction (MI), Mitral Valve Prolapse (MVP)


Last Myocardial Infarction Date:: unknown


History of Any Multi-Drug Resistant Organisms: None Reported


Past Surgical History: Orthopedic Surgery


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


Past Anesthesia/Blood Transfusion Reactions: No Reported Reaction


Past Psychological History: No Psychological Hx Reported


Smoking Status: Former smoker


Past Alcohol Use History: None Reported


Past Drug Use History: None Reported





General Exam


Limitations: no limitations





Course


                                   Vital Signs











  01/04/21 01/04/21





  13:36 15:03


 


Temperature 98.6 F 


 


Pulse Rate 122 H 61


 


Respiratory 18 18





Rate  


 


Blood Pressure 105/69 110/74


 


O2 Sat by Pulse 91 L 98





Oximetry  














EKG Findings





- EKG Comments:


EKG Findings:: EKG demonstrates A. fib with a rate of 80.  .  .  

No acute ST segment elevations or depressions





Medical Decision Making





- Medical Decision Making





On arrival patient is placed into room 20.  A thorough history and physical exam

 was performed.  Lab restudies were conducted and the patient went for a chest 

x-ray as well as CT of his brain.  Lab studies are reviewed.  Patient is sub

therapeutic at 1.2.  Creatinine is 1.4 which is down from the patient's baseline

 of 1.  Troponin is negative.  Chest x-ray does demonstrate mild patchy 

interstitial densities especially on the right.  CT of the patient's brain 

demonstrates no acute intracranial hemorrhage or midline shift.  Moderate 

diffuse cerebral atrophy.  Patient remains on the telemetry monitor and has a 

heart rate anywhere from 40 to 122.  I did recommend hospital admission for 

which the patient did agree to.  Spoke with Dr. Seo who accept admission.  

The patient is awaiting a bed on the floor





- Lab Data


Result diagrams: 


                                 01/04/21 14:22





                                 01/04/21 14:22


                                   Lab Results











  01/04/21 01/04/21 01/04/21 Range/Units





  14:22 14:22 14:22 


 


WBC  9.3    (3.8-10.6)  k/uL


 


RBC  4.33    (4.30-5.90)  m/uL


 


Hgb  13.4    (13.0-17.5)  gm/dL


 


Hct  40.3    (39.0-53.0)  %


 


MCV  93.1    (80.0-100.0)  fL


 


MCH  30.9    (25.0-35.0)  pg


 


MCHC  33.2    (31.0-37.0)  g/dL


 


RDW  15.2    (11.5-15.5)  %


 


Plt Count  446    (150-450)  k/uL


 


MPV  7.4    


 


Neutrophils %  63    %


 


Lymphocytes %  22    %


 


Monocytes %  7    %


 


Eosinophils %  5    %


 


Basophils %  2    %


 


Neutrophils #  5.9    (1.3-7.7)  k/uL


 


Lymphocytes #  2.1    (1.0-4.8)  k/uL


 


Monocytes #  0.6    (0-1.0)  k/uL


 


Eosinophils #  0.4    (0-0.7)  k/uL


 


Basophils #  0.2    (0-0.2)  k/uL


 


PT   12.2 H   (9.0-12.0)  sec


 


INR   1.2 H   (<1.2)  


 


APTT   25.0   (22.0-30.0)  sec


 


Sodium    132 L  (137-145)  mmol/L


 


Potassium    5.4 H  (3.5-5.1)  mmol/L


 


Chloride    100  ()  mmol/L


 


Carbon Dioxide    24  (22-30)  mmol/L


 


Anion Gap    8  mmol/L


 


BUN    34 H  (9-20)  mg/dL


 


Creatinine    1.46 H  (0.66-1.25)  mg/dL


 


Est GFR (CKD-EPI)AfAm    50  (>60 ml/min/1.73 sqM)  


 


Est GFR (CKD-EPI)NonAf    44  (>60 ml/min/1.73 sqM)  


 


Glucose    99  (74-99)  mg/dL


 


Calcium    9.5  (8.4-10.2)  mg/dL


 


Magnesium    1.8  (1.6-2.3)  mg/dL


 


Total Bilirubin    0.4  (0.2-1.3)  mg/dL


 


AST    25  (17-59)  U/L


 


ALT    14  (4-49)  U/L


 


Alkaline Phosphatase    87  ()  U/L


 


Troponin I     (0.000-0.034)  ng/mL


 


Total Protein    7.2  (6.3-8.2)  g/dL


 


Albumin    3.8  (3.5-5.0)  g/dL


 


TSH    1.310  (0.465-4.680)  mIU/L














  01/04/21 Range/Units





  14:22 


 


WBC   (3.8-10.6)  k/uL


 


RBC   (4.30-5.90)  m/uL


 


Hgb   (13.0-17.5)  gm/dL


 


Hct   (39.0-53.0)  %


 


MCV   (80.0-100.0)  fL


 


MCH   (25.0-35.0)  pg


 


MCHC   (31.0-37.0)  g/dL


 


RDW   (11.5-15.5)  %


 


Plt Count   (150-450)  k/uL


 


MPV   


 


Neutrophils %   %


 


Lymphocytes %   %


 


Monocytes %   %


 


Eosinophils %   %


 


Basophils %   %


 


Neutrophils #   (1.3-7.7)  k/uL


 


Lymphocytes #   (1.0-4.8)  k/uL


 


Monocytes #   (0-1.0)  k/uL


 


Eosinophils #   (0-0.7)  k/uL


 


Basophils #   (0-0.2)  k/uL


 


PT   (9.0-12.0)  sec


 


INR   (<1.2)  


 


APTT   (22.0-30.0)  sec


 


Sodium   (137-145)  mmol/L


 


Potassium   (3.5-5.1)  mmol/L


 


Chloride   ()  mmol/L


 


Carbon Dioxide   (22-30)  mmol/L


 


Anion Gap   mmol/L


 


BUN   (9-20)  mg/dL


 


Creatinine   (0.66-1.25)  mg/dL


 


Est GFR (CKD-EPI)AfAm   (>60 ml/min/1.73 sqM)  


 


Est GFR (CKD-EPI)NonAf   (>60 ml/min/1.73 sqM)  


 


Glucose   (74-99)  mg/dL


 


Calcium   (8.4-10.2)  mg/dL


 


Magnesium   (1.6-2.3)  mg/dL


 


Total Bilirubin   (0.2-1.3)  mg/dL


 


AST   (17-59)  U/L


 


ALT   (4-49)  U/L


 


Alkaline Phosphatase   ()  U/L


 


Troponin I  <0.012  (0.000-0.034)  ng/mL


 


Total Protein   (6.3-8.2)  g/dL


 


Albumin   (3.5-5.0)  g/dL


 


TSH   (0.465-4.680)  mIU/L














Disposition


Clinical Impression: 


 Atrial fibrillation, Palpitations, Subtherapeutic anticoagulation





Disposition: ADMITTED AS IP TO THIS [facility]


Condition: Stable


Is patient prescribed a controlled substance at d/c from ED?: No


Referrals: 


Nick Crawford MD [Primary Care Provider] - 1-2 days


Decision to Admit Reason: Admit from EC


Decision Date: 01/04/21


Decision Time: 15:39

## 2021-01-04 NOTE — CT
EXAMINATION TYPE: CT brain wo con

 

DATE OF EXAM: 1/4/2021

 

HISTORY: headache, on coumadin

 

CT DLP: 1098.4 mGycm.  Automated Exposure Control for Dose Reduction was Utilized.

 

TECHNIQUE: CT scan of the head is performed without contrast.

 

COMPARISON: CT brain December 16, 2020.

 

FINDINGS:   There is no acute intracranial hemorrhage or midline shift identified. There is diffuse v
entricular and sulcal prominence consistent with diffuse age-related cerebral atrophy. Ventricular si
ze is stable and somewhat prominent. There is low-attenuation in the periventricular white matter con
sistent with chronic small vessel ischemic change.  The globes are intact and the visualized sinuses 
are clear.  Sclerosis surrounding the right maxillary sinus is redemonstrated

 

IMPRESSION:  No acute intracranial hemorrhage or midline shift.  There is moderate diffuse cerebral a
trophy and chronic small vessel ischemic change redemonstrated.  No significant change from prior.

## 2021-01-05 LAB
ANION GAP SERPL CALC-SCNC: 4 MMOL/L
BASOPHILS # BLD AUTO: 0.1 K/UL (ref 0–0.2)
BASOPHILS NFR BLD AUTO: 1 %
BUN SERPL-SCNC: 30 MG/DL (ref 9–20)
CALCIUM SPEC-MCNC: 9.5 MG/DL (ref 8.4–10.2)
CHLORIDE SERPL-SCNC: 100 MMOL/L (ref 98–107)
CO2 SERPL-SCNC: 28 MMOL/L (ref 22–30)
EOSINOPHIL # BLD AUTO: 0.4 K/UL (ref 0–0.7)
EOSINOPHIL NFR BLD AUTO: 5 %
ERYTHROCYTE [DISTWIDTH] IN BLOOD BY AUTOMATED COUNT: 4.03 M/UL (ref 4.3–5.9)
ERYTHROCYTE [DISTWIDTH] IN BLOOD: 15.2 % (ref 11.5–15.5)
GLUCOSE SERPL-MCNC: 96 MG/DL (ref 74–99)
HCT VFR BLD AUTO: 38 % (ref 39–53)
HGB BLD-MCNC: 12.5 GM/DL (ref 13–17.5)
INR PPP: 1.3 (ref ?–1.2)
LYMPHOCYTES # SPEC AUTO: 2.6 K/UL (ref 1–4.8)
LYMPHOCYTES NFR SPEC AUTO: 29 %
MCH RBC QN AUTO: 30.9 PG (ref 25–35)
MCHC RBC AUTO-ENTMCNC: 32.8 G/DL (ref 31–37)
MCV RBC AUTO: 94.3 FL (ref 80–100)
MONOCYTES # BLD AUTO: 0.7 K/UL (ref 0–1)
MONOCYTES NFR BLD AUTO: 8 %
NEUTROPHILS # BLD AUTO: 4.8 K/UL (ref 1.3–7.7)
NEUTROPHILS NFR BLD AUTO: 55 %
PLATELET # BLD AUTO: 358 K/UL (ref 150–450)
POTASSIUM SERPL-SCNC: 5.1 MMOL/L (ref 3.5–5.1)
PT BLD: 13.6 SEC (ref 9–12)
SODIUM SERPL-SCNC: 132 MMOL/L (ref 137–145)
WBC # BLD AUTO: 8.8 K/UL (ref 3.8–10.6)

## 2021-01-05 RX ADMIN — LATANOPROST SCH DROPS: 50 SOLUTION OPHTHALMIC at 21:39

## 2021-01-05 RX ADMIN — DILTIAZEM HYDROCHLORIDE SCH MG: 180 CAPSULE, COATED, EXTENDED RELEASE ORAL at 09:43

## 2021-01-05 RX ADMIN — PANTOPRAZOLE SODIUM SCH MG: 40 TABLET, DELAYED RELEASE ORAL at 09:44

## 2021-01-05 RX ADMIN — CEFAZOLIN SCH: 330 INJECTION, POWDER, FOR SOLUTION INTRAMUSCULAR; INTRAVENOUS at 18:59

## 2021-01-05 RX ADMIN — ASPIRIN 81 MG CHEWABLE TABLET SCH MG: 81 TABLET CHEWABLE at 09:43

## 2021-01-05 RX ADMIN — METOPROLOL TARTRATE SCH MG: 25 TABLET, FILM COATED ORAL at 09:44

## 2021-01-05 RX ADMIN — Medication SCH MG: at 21:46

## 2021-01-05 RX ADMIN — Medication SCH MG: at 09:44

## 2021-01-05 RX ADMIN — BUDESONIDE SCH: 1 SUSPENSION RESPIRATORY (INHALATION) at 08:25

## 2021-01-05 RX ADMIN — TROSPIUM CHLORIDE SCH MG: 20 TABLET, FILM COATED ORAL at 21:40

## 2021-01-05 RX ADMIN — ATORVASTATIN CALCIUM SCH MG: 40 TABLET, FILM COATED ORAL at 21:39

## 2021-01-05 RX ADMIN — CEFAZOLIN SCH MLS/HR: 330 INJECTION, POWDER, FOR SOLUTION INTRAMUSCULAR; INTRAVENOUS at 09:44

## 2021-01-05 RX ADMIN — METOPROLOL TARTRATE SCH MG: 25 TABLET, FILM COATED ORAL at 21:40

## 2021-01-05 RX ADMIN — DILTIAZEM HYDROCHLORIDE SCH MG: 180 CAPSULE, COATED, EXTENDED RELEASE ORAL at 09:42

## 2021-01-05 RX ADMIN — ENOXAPARIN SODIUM SCH MG: 80 INJECTION SUBCUTANEOUS at 09:42

## 2021-01-05 RX ADMIN — TROSPIUM CHLORIDE SCH MG: 20 TABLET, FILM COATED ORAL at 09:43

## 2021-01-05 RX ADMIN — BUDESONIDE SCH: 1 SUSPENSION RESPIRATORY (INHALATION) at 20:03

## 2021-01-05 RX ADMIN — ENOXAPARIN SODIUM SCH MG: 80 INJECTION SUBCUTANEOUS at 21:39

## 2021-01-05 RX ADMIN — THERA TABS SCH EACH: TAB at 09:43

## 2021-01-05 NOTE — ECHOF
Referral Reason:LV function



MEASUREMENTS

--------

HEIGHT: 165.1 cm

WEIGHT: 77.1 kg

BP: 

IVSd:   1.3 cm     (0.6 - 1.1)

LVIDd:   4.3 cm     (3.9 - 5.3)

LVPWd:   1.2 cm     (0.6 - 1.1)

IVSs:   1.4 cm

LVIDs:   4.0 cm

LVPWs:   1.6 cm

LA Diam:   4.6 cm     (2.7 - 3.8)

LAESV Index (A-L):   111.83 ml/m

Ao Diam:   3.5 cm     (2.0 - 3.7)

AV Cusp:   1.7 cm     (1.5 - 2.6)

MV EXCURSION:   19.783 mm     (> 18.000)

MV EF SLOPE:   99 mm/s     (70 - 150)

EPSS:   0.7 cm







FINDINGS

--------

Atrial fibrillation.

This was a technically good study.

The left ventricular size is normal.   Left ventricular wall thickness is normal.   Overall left vent
ricular systolic function is mild-moderately impaired with, an EF between 40 - 45 %.

The right ventricle is normal in size.

LA is severely dilated >40 ml/m2

The right atrial size is normal.

There is mild aortic regurgitation.

Moderate mitral annular calcification present.   Severe mitral regurgitation is present.

Mild tricuspid regurgitation present.

There is no pulmonic regurgitation present.

The aortic root size is normal.

There is no pericardial effusion.



CONCLUSIONS

--------

1. The left ventricular size is normal.

2. Left ventricular wall thickness is normal.

3. Overall left ventricular systolic function is mild-moderately impaired with, an EF between 40 - 45
 %.

4. The right ventricle is normal in size.

5. LA is severely dilated >40 ml/m2

6. The right atrial size is normal.

7. There is mild aortic regurgitation.

8. Moderate mitral annular calcification present.

9. Severe mitral regurgitation is present.

10. Mild tricuspid regurgitation present.

11. There is no pulmonic regurgitation present.

12. The aortic root size is normal.

13. There is no pericardial effusion.





SONOGRAPHER: Ree Zimmerman RDCS

## 2021-01-05 NOTE — P.HPIM
History of Present Illness


H&P Date: 01/05/21


Chief Complaint: Dizzy with palpitations





History of presenting complaint:


This is a 85-year-old patient of Dr. Nick Crawford.  Chronic stable medical 

conditions include abdominal aortic aneurysm 4.7 cm being followed by Dr. Mcnulty from vascular, hyperlipidemia, essential hypertension, coronary artery 

disease with prior MI cognitive impairment from late-onset Alzheimer's dementia.


Patient now presents with 1 day history of significant palpitations.  Short of 

breath.  Unable to walk.  Dizzy.  Heart it is better control by the time he was 

in the ER.  Cardiology was consulted.  Apparently patient was cardioverted at 

[facility] recently.  Denies any chest pain.  Feeling better this

morning.





Review of systems:


GEN.:  Tired


EYES: None


HEENT: None


NECK: None


RESPIRATORY: None


CARDIOVASCULAR: As above


GASTROINTESTINAL: None


GENITOURINARY: None


MUSCULOSKELETAL: Joint pains


LYMPHATICS: None


HEMATOLOGICAL: None  


PSYCHIATRY: Forgetful


NEUROLOGICAL: None





Past medical history to include:


Hyperlipidemia, hypertension, myocardial infarction, mitral valve prolapse a 

splenectomy.  Suprapubic catheter for a previous bladder problem, atrial 

fibrillation, abdominal aortic aneurysm 4.7 cm being for upper Dr. Partida





Social history:


Lives at home with his wife.  No smoking





Family history:


Patient cannot tell





Physical examination:


VITAL SIGNS: 98.1, 106, 18, 115/82, 93%


GENERAL: BMI 27.4, laying in bed, comfortable.


EYES: Pupils equal.  Conjunctiva normal.


HEENT: External appearance of nose and ears normal, oral cavity grossly normal.


NECK: JVD not raised; masses not palpable.


HEART: Irregular heart sounds;  no edema.  


LUNGS: Respiratory rate normal; decreased breath sounds.  


ABDOMEN: Soft,  nontender, liver spleen not palpable, no masses palpable.  


PSYCH: Able tonsil simple questions.  


NEUROLOGICAL: Cranial nerves grossly intact; no facial asymmetry,   power and 

sensation grossly intact. 


LYMPHATICS: No lymph nodes palpable in the axilla and neck





INVESTIGATIONS, reviewed in the clinical context:


White count 8.8 hemoglobin 12.5 platelets 358 potassium 5.1 bun 30 creatinine 

1.48


EKG tracing personally reviewed by me-atrial fibrillation rate controlled


Computed tomography scan of the brain-chronic changes with moderate diffuse 

cerebral atrophy


Chest x-ray film-personally reviewed by me: some scattered interstitial density





Assessment:


-Persistent atrial fibrillation with an episode of rapid ventricle rate 

symptomatic uncontrolled


-Chronic kidney disease stage III likely nephrosclerosis


-Hyperkalemia , on presentation


-Abdominal aortic aneurysm a 4.7 cm-follow with Dr. Partida vascular


-Hyperlipidemia


-Essential hypertension


-Coronary artery disease with prior MI


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





Plan:


Gentle hydration.  Because of hyperkalemia  lisinopril has been held.  Also 

meloxicam.  Repeat labs in the morning.  Cardiology consulted.  Will request 

records from [facility].  Symptoms are better controlled this 

morning.











Past Medical History


Past Medical History: Atrial Fibrillation, Chest Pain / Angina, Hyperlipidemia, 

Hypertension, Myocardial Infarction (MI), Mitral Valve Prolapse (MVP)


Last Myocardial Infarction Date:: unknown


History of Any Multi-Drug Resistant Organisms: None Reported


Past Surgical History: Orthopedic Surgery


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


Past Anesthesia/Blood Transfusion Reactions: No Reported Reaction


Past Psychological History: No Psychological Hx Reported


Smoking Status: Former smoker


Past Alcohol Use History: None Reported


Past Drug Use History: None Reported





Medications and Allergies


                                Home Medications











 Medication  Instructions  Recorded  Confirmed  Type


 


Aspirin EC [Ecotrin Low Dose] 81 mg PO DAILY 12/28/16 01/04/21 History


 


Metoprolol Tartrate [Lopressor] 25 mg PO BID 12/28/16 01/04/21 History


 


Multivitamin [Men's Multi-Vitamin] 1 tab PO DAILY 12/28/16 01/04/21 History


 


Acetaminophen [Tylenol] 1,000 mg PO Q8H PRN 12/16/20 01/04/21 History


 


Budesonide [Pulmicort] 1 mg INHALATION RT-BID@0700,1900 12/16/20 01/04/21 

History


 


Diltiazem Cd [Cardizem CD] 180 mg PO DAILY 12/16/20 01/04/21 History


 


Ipratropium-Albuterol Nebulize 3 ml INHALATION RT-QID PRN 12/16/20 01/04/21 

History





[Duoneb 0.5 mg-3 mg/3 ml Soln]    


 


Latanoprost/Pf [Latanoprost 0.005% 1 drop BOTH EYES HS 12/16/20 01/04/21 History





Eye Drop]    


 


Magnesium Oxide [Magox 400] 400 mg PO DAILY 12/16/20 01/04/21 History


 


Meloxicam 15 mg PO DAILY 12/16/20 01/04/21 History


 


Omeprazole [PriLOSEC] 20 mg PO BID 12/16/20 01/04/21 History


 


Trospium Chloride 20 mg PO BID 12/16/20 01/04/21 History


 


Melatonin 6 mg PO HS #0 12/18/20 01/04/21 Rx


 


Spironolactone [Aldactone] 25 mg PO DAILY #30 tablet 12/18/20 01/04/21 Rx


 


Warfarin [Coumadin] 2 mg PO HS #0 12/18/20 01/04/21 Rx


 


Atorvastatin [Lipitor] 40 mg PO HS 01/04/21 01/04/21 History


 


Cyclobenzaprine [Flexeril] 5 mg PO TID PRN 01/04/21 01/04/21 History


 


FLUoxetine HCL [PROzac] 10 mg PO BID 01/04/21 01/04/21 History


 


lisinopriL 20 mg PO HS 01/04/21 01/04/21 History








                                    Allergies











Allergy/AdvReac Type Severity Reaction Status Date / Time


 


hydromorphone [From Dilaudid] AdvReac  Unknown Verified 01/04/21 15:11


 


shellfish derived [Shrimp] AdvReac  Unknown Verified 01/04/21 15:11














Physical Exam


Vitals: 


                                   Vital Signs











  Temp Pulse Pulse Resp BP BP Pulse Ox


 


 01/05/21 09:00  97.7 F   92  16   124/76  99


 


 01/05/21 06:16  98.1 F  106 H   18  115/82   93 L


 


 01/05/21 06:00   54 L   16   


 


 01/05/21 05:00   58 L   18   


 


 01/05/21 04:00   54 L   18   


 


 01/05/21 01:45  98.0 F  90   18  97/60   100


 


 01/05/21 01:00   84   18   


 


 01/05/21 00:00   80   18   


 


 01/04/21 23:00   72   18   


 


 01/04/21 22:00   73   18   


 


 01/04/21 21:53  98.1 F  70   18  112/77   98


 


 01/04/21 21:00   60   18   


 


 01/04/21 20:00   75     


 


 01/04/21 19:12   58 L   19   


 


 01/04/21 18:31   61   18  103/85   99


 


 01/04/21 15:45   55 L   18  110/74   100


 


 01/04/21 15:03   61   18  110/74   98


 


 01/04/21 13:36  98.6 F  122 H   18  105/69   91 L








                                Intake and Output











 01/04/21 01/05/21 01/05/21





 22:59 06:59 14:59


 


Other:   


 


  # Voids  1 














Results


CBC & Chem 7: 


                                 01/05/21 06:33





                                 01/05/21 06:33


Labs: 


                  Abnormal Lab Results - Last 24 Hours (Table)











  01/04/21 01/04/21 01/05/21 Range/Units





  14:22 14:22 06:22 


 


RBC     (4.30-5.90)  m/uL


 


Hgb     (13.0-17.5)  gm/dL


 


Hct     (39.0-53.0)  %


 


PT  12.2 H   13.6 H  (9.0-12.0)  sec


 


INR  1.2 H   1.3 H  (<1.2)  


 


Sodium   132 L   (137-145)  mmol/L


 


Potassium   5.4 H   (3.5-5.1)  mmol/L


 


BUN   34 H   (9-20)  mg/dL


 


Creatinine   1.46 H   (0.66-1.25)  mg/dL














  01/05/21 01/05/21 Range/Units





  06:33 06:33 


 


RBC  4.03 L   (4.30-5.90)  m/uL


 


Hgb  12.5 L   (13.0-17.5)  gm/dL


 


Hct  38.0 L   (39.0-53.0)  %


 


PT    (9.0-12.0)  sec


 


INR    (<1.2)  


 


Sodium   132 L  (137-145)  mmol/L


 


Potassium    (3.5-5.1)  mmol/L


 


BUN   30 H  (9-20)  mg/dL


 


Creatinine   1.48 H  (0.66-1.25)  mg/dL

## 2021-01-05 NOTE — P.CRDCN
History of Present Illness


Consult date: 01/05/21


History of present illness: 





CHIEF COMPLAINT:  Palpitations





HISTORY OF PRESENT ILLNESS:  This is a 85-year-old male with a past medical 

history significant for hypertension, hyperlipidemia, and atrial fibrillation on

anticoagulation with Coumadin. Patient follows in the office with Dr. Cast. We 

have been asked to see the patient in consultation for atrial fib.  Patient 

examined this morning at the bedside in the emergency room.  Patient states he 

began having palpitations yesterday which prompted him to come to the emergency 

room for evaluation.  He denies chest pain or pressure.  He denies shortness of 

breath.  Denies nausea or vomiting.  Denies dizziness or lightheadedness.  It is

noted the patient was recently hospitalized at [facility] and 

December 2020 secondary to pneumonia and age of fibrillation.  The patient was 

on mechanical ventilation at that time.  The patient states he was "put back in

to a normal rhythm" by Dr. Zambrano during that hospitalization. No records are 

available at this time for review. 





DIAGNOSTICS:


EKG reveals atrial fibrillation with controlled ventricular rate


Chest xray mild patchy interstitial density especially on the right.  Correlate 

to exclude mild pulmonary vascular congestion or atypical pneumonia


Laboratory data: W BC 8.8.  Hemoglobin 12.5.  Platelet count 358.  INR 1.3.  

Sodium 132.  Potassium 5.1.  BUN 30.  Creatinine 1.48.  Troponin negative 3.  

TSH 1.310.


Current home cardiac medications include lisinopril 20 mg daily, Coumadin 2 mg 

daily, Aldactone 25 mg daily, Cardizem 180 mg daily, metoprolol 25 mg twice a 

day, Lipitor 40 mg daily, and aspirin 81 mg daily





REVIEW OF SYSTEMS: 


At the time of my exam:


CONSTITUTIONAL: Denies fever or chills.


HEENT: Denies blurred vision, vision changes, or eye pain. Denies hemoptysis 


CARDIOVASCULAR: Denies chest pain, orthopnea, PND or palpitations


RESPIRATORY: No shortness of breath. 


GASTROINTESTINAL: Denies abdominal pain. Denies nausea or vomiting. 


HEMATOLOGIC: Denies bleeding disorders.


GENITOURINARY:  Denies any blood in urine.


SKIN: Denies pruitis. Denies rash.





PHYSICAL EXAM: 


VITAL SIGNS: Reviewed.


GENERAL: Well-developed in no acute distress. 


HEENT: Head is normocephalic. Pupils are equal, round. Sclerae anicteric. Mucous

membranes of the mouth are moist. Neck supple. No JVD or thyromegaly


LUNGS: Respirations even and unlabored. Lungs diminished bilaterally


HEART: Irregular rate and rhythm.  S1 and S2 heard.  Systolic murmur noted.


ABDOMEN: Soft. Nondistended. Nontender.


EXTREMITIES: Normal range of motion.  No clubbing or cyanosis.  Peripheral 

pulses intact.  No lower extremity edema


NEUROLOGIC: Awake and alert. Oriented x 3. 





ASSESSMENT: 


Palpitations


Paroxysmal atrial fibrillation on anticoagulation with Coumadin


Subtherapeutic INR


Recent hospitalization secondary to pneumonia requiring mechanical ventilation


Hypertension


Hyperlipidemia





PLAN: 


Resume home cardiac medications


Continue telemetry monitoring. Patients heart rate is currently controlled in 

the 90s.


Obtain 2D echo to assess cardiac structure and function


Obtain records of recent hospitalization at [facility] for review


Further recommendations pending patient course





Nurse practitioner note has been reviewed by physician. Signing provider agrees 

with the documented findings, assessment, and plan of care. 








Past Medical History


Past Medical History: Atrial Fibrillation, Chest Pain / Angina, Hyperlipidemia, 

Hypertension, Myocardial Infarction (MI), Mitral Valve Prolapse (MVP)


Last Myocardial Infarction Date:: unknown


History of Any Multi-Drug Resistant Organisms: None Reported


Past Surgical History: Orthopedic Surgery


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


Past Anesthesia/Blood Transfusion Reactions: No Reported Reaction


Past Psychological History: No Psychological Hx Reported


Smoking Status: Former smoker


Past Alcohol Use History: None Reported


Past Drug Use History: None Reported





Medications and Allergies


                                Home Medications











 Medication  Instructions  Recorded  Confirmed  Type


 


Aspirin EC [Ecotrin Low Dose] 81 mg PO DAILY 12/28/16 01/04/21 History


 


Metoprolol Tartrate [Lopressor] 25 mg PO BID 12/28/16 01/04/21 History


 


Multivitamin [Men's Multi-Vitamin] 1 tab PO DAILY 12/28/16 01/04/21 History


 


Acetaminophen [Tylenol] 1,000 mg PO Q8H PRN 12/16/20 01/04/21 History


 


Budesonide [Pulmicort] 1 mg INHALATION RT-BID@0700,1900 12/16/20 01/04/21 

History


 


Diltiazem Cd [Cardizem CD] 180 mg PO DAILY 12/16/20 01/04/21 History


 


Ipratropium-Albuterol Nebulize 3 ml INHALATION RT-QID PRN 12/16/20 01/04/21 

History





[Duoneb 0.5 mg-3 mg/3 ml Soln]    


 


Latanoprost/Pf [Latanoprost 0.005% 1 drop BOTH EYES HS 12/16/20 01/04/21 History





Eye Drop]    


 


Magnesium Oxide [Magox 400] 400 mg PO DAILY 12/16/20 01/04/21 History


 


Meloxicam 15 mg PO DAILY 12/16/20 01/04/21 History


 


Omeprazole [PriLOSEC] 20 mg PO BID 12/16/20 01/04/21 History


 


Trospium Chloride 20 mg PO BID 12/16/20 01/04/21 History


 


Melatonin 6 mg PO HS #0 12/18/20 01/04/21 Rx


 


Spironolactone [Aldactone] 25 mg PO DAILY #30 tablet 12/18/20 01/04/21 Rx


 


Warfarin [Coumadin] 2 mg PO HS #0 12/18/20 01/04/21 Rx


 


Atorvastatin [Lipitor] 40 mg PO HS 01/04/21 01/04/21 History


 


Cyclobenzaprine [Flexeril] 5 mg PO TID PRN 01/04/21 01/04/21 History


 


FLUoxetine HCL [PROzac] 10 mg PO BID 01/04/21 01/04/21 History


 


lisinopriL 20 mg PO HS 01/04/21 01/04/21 History








                                    Allergies











Allergy/AdvReac Type Severity Reaction Status Date / Time


 


hydromorphone [From Dilaudid] AdvReac  Unknown Verified 01/04/21 15:11


 


shellfish derived [Shrimp] AdvReac  Unknown Verified 01/04/21 15:11














Physical Exam


Vitals: 


                                   Vital Signs











  Temp Pulse Pulse Resp BP BP Pulse Ox


 


 01/05/21 09:00  97.7 F   92  16   124/76  99


 


 01/05/21 06:16  98.1 F  106 H   18  115/82   93 L


 


 01/05/21 06:00   54 L   16   


 


 01/05/21 05:00   58 L   18   


 


 01/05/21 04:00   54 L   18   


 


 01/05/21 01:45  98.0 F  90   18  97/60   100


 


 01/05/21 01:00   84   18   


 


 01/05/21 00:00   80   18   


 


 01/04/21 23:00   72   18   


 


 01/04/21 22:00   73   18   


 


 01/04/21 21:53  98.1 F  70   18  112/77   98


 


 01/04/21 21:00   60   18   


 


 01/04/21 20:00   75     


 


 01/04/21 19:12   58 L   19   


 


 01/04/21 18:31   61   18  103/85   99


 


 01/04/21 15:45   55 L   18  110/74   100


 


 01/04/21 15:03   61   18  110/74   98


 


 01/04/21 13:36  98.6 F  122 H   18  105/69   91 L








                                Intake and Output











 01/04/21 01/05/21 01/05/21





 22:59 06:59 14:59


 


Other:   


 


  # Voids  1 














Results





                                 01/05/21 06:33





                                 01/05/21 06:33


                                 Cardiac Enzymes











  01/04/21 01/04/21 01/04/21 Range/Units





  14:22 14:22 17:36 


 


AST  25    (17-59)  U/L


 


Troponin I   <0.012  <0.012  (0.000-0.034)  ng/mL














  01/04/21 Range/Units





  20:15 


 


AST   (17-59)  U/L


 


Troponin I  <0.012  (0.000-0.034)  ng/mL








                                   Coagulation











  01/04/21 01/05/21 Range/Units





  14:22 06:22 


 


PT  12.2 H  13.6 H  (9.0-12.0)  sec


 


APTT  25.0   (22.0-30.0)  sec








                                       CBC











  01/04/21 01/05/21 Range/Units





  14:22 06:33 


 


WBC  9.3  8.8  (3.8-10.6)  k/uL


 


RBC  4.33  4.03 L  (4.30-5.90)  m/uL


 


Hgb  13.4  12.5 L  (13.0-17.5)  gm/dL


 


Hct  40.3  38.0 L  (39.0-53.0)  %


 


Plt Count  446  358  (150-450)  k/uL








                          Comprehensive Metabolic Panel











  01/04/21 01/05/21 Range/Units





  14:22 06:33 


 


Sodium  132 L  132 L  (137-145)  mmol/L


 


Potassium  5.4 H  5.1  (3.5-5.1)  mmol/L


 


Chloride  100  100  ()  mmol/L


 


Carbon Dioxide  24  28  (22-30)  mmol/L


 


BUN  34 H  30 H  (9-20)  mg/dL


 


Creatinine  1.46 H  1.48 H  (0.66-1.25)  mg/dL


 


Glucose  99  96  (74-99)  mg/dL


 


Calcium  9.5  9.5  (8.4-10.2)  mg/dL


 


AST  25   (17-59)  U/L


 


ALT  14   (4-49)  U/L


 


Alkaline Phosphatase  87   ()  U/L


 


Total Protein  7.2   (6.3-8.2)  g/dL


 


Albumin  3.8   (3.5-5.0)  g/dL








                               Current Medications











Generic Name Dose Route Start Last Admin





  Trade Name Freq  PRN Reason Stop Dose Admin


 


Acetaminophen  1,000 mg  01/04/21 15:41 





  Acetaminophen Tab 500 Mg Tab  PO  





  Q8H PRN  





  Moderate Pain  


 


Albuterol/Ipratropium  3 ml  01/04/21 15:41 





  Ipratropium-Albuterol 3 Ml Neb  INHALATION  





  RT-QID PRN  





  Shortness Of Breath  


 


Aspirin  81 mg  01/05/21 09:00  01/05/21 09:43





  Aspirin 81 Mg  PO   81 mg





  DAILY BOUBACAR   Administration


 


Atorvastatin Calcium  40 mg  01/04/21 21:00  01/04/21 21:55





  Atorvastatin 40 Mg Tab  PO   40 mg





  HS BOUBACAR   Administration


 


Budesonide  1 mg  01/04/21 19:00  01/05/21 08:25





  Budesonide 1 Mg/2 Ml Nebu  INHALATION   Not Given





  RT-BID@0700,1900 BOUBACAR  


 


Cyclobenzaprine HCl  5 mg  01/04/21 15:41  01/05/21 09:44





  Cyclobenzaprine 5 Mg Tab  PO   5 mg





  TID PRN   Administration





  Muscle Spasm  


 


Diltiazem HCl  180 mg  01/05/21 09:00  01/05/21 09:43





  Diltiazem Cd 180 Mg Cap.Er.24h  PO   180 mg





  DAILY BOUBACAR   Administration


 


Enoxaparin Sodium  80 mg  01/04/21 21:00  01/05/21 09:42





  Enoxaparin 80 Mg/0.8 Ml Syringe  SQ   80 mg





  BID BOUBACAR   Administration


 


Fluoxetine HCl  10 mg  01/04/21 21:00  01/05/21 09:43





  Fluoxetine Hcl 10 Mg Cap  PO   10 mg





  BID BOUBACAR   Administration


 


Sodium Chloride  1,000 mls @ 75 mls/hr  01/04/21 23:15  01/05/21 09:44





  Saline 0.9%  IV   75 mls/hr





  [id number] BOUBACAR   Administration


 


Latanoprost  1 drops  01/04/21 21:00  01/04/21 21:59





  Latanoprost 0.005% Ophth Drops 2.5 Ml Btl  BOTH EYES   Not Given





  HS BOUBACAR  


 


Magnesium Oxide  400 mg  01/05/21 09:00  01/05/21 09:44





  Magnesium Oxide 400 Mg Tab  PO   400 mg





  DAILY BOUBACAR   Administration


 


Melatonin  6 mg  01/04/21 21:00  01/04/21 21:56





  Melatonin 3 Mg Tablet  PO   6 mg





  HS BOUBACAR   Administration


 


Metoprolol Tartrate  25 mg  01/04/21 21:00  01/05/21 09:44





  Metoprolol Tartrate 25 Mg Tab  PO   25 mg





  BID BOUBACAR   Administration


 


Miscellaneous Information  1 each  01/04/21 16:37 





  Warfarin Per Pharmacy  MISCELLANE  





  AS DIRECTED PRN  





  Per Protocol  


 


Multivitamins  1 each  01/05/21 09:00  01/05/21 09:43





  Multivitamins, Thera 1 Each Tab  PO   1 each





  DAILY BOUBACAR   Administration


 


Naloxone HCl  0.2 mg  01/04/21 15:39 





  Naloxone 0.4 Mg/Ml 1 Ml Vial  IV  





  Q2M PRN  





  Opioid Reversal  


 


Pantoprazole Sodium  40 mg  01/04/21 16:15  01/05/21 09:44





  Pantoprazole 40 Mg Tablet  PO   40 mg





  [id number] BOUBACAR   Administration


 


Trospium  20 mg  01/04/21 21:00  01/05/21 09:43





  Trospium Chloride 20 Mg Tablet  PO   20 mg





  BID BOUBACAR   Administration








                                Intake and Output











 01/04/21 01/05/21 01/05/21





 22:59 06:59 14:59


 


Other:   


 


  # Voids  1 








                                        





                                 01/05/21 06:33 





                                 01/05/21 06:33

## 2021-01-06 VITALS
DIASTOLIC BLOOD PRESSURE: 94 MMHG | HEART RATE: 108 BPM | RESPIRATION RATE: 16 BRPM | SYSTOLIC BLOOD PRESSURE: 142 MMHG | TEMPERATURE: 98 F

## 2021-01-06 LAB
INR PPP: 1.6 (ref ?–1.2)
PT BLD: 15.9 SEC (ref 9–12)

## 2021-01-06 RX ADMIN — CEFAZOLIN SCH: 330 INJECTION, POWDER, FOR SOLUTION INTRAMUSCULAR; INTRAVENOUS at 03:34

## 2021-01-06 RX ADMIN — BUDESONIDE SCH: 1 SUSPENSION RESPIRATORY (INHALATION) at 07:45

## 2021-01-06 RX ADMIN — DILTIAZEM HYDROCHLORIDE SCH MG: 180 CAPSULE, COATED, EXTENDED RELEASE ORAL at 08:47

## 2021-01-06 RX ADMIN — THERA TABS SCH EACH: TAB at 08:46

## 2021-01-06 RX ADMIN — PANTOPRAZOLE SODIUM SCH MG: 40 TABLET, DELAYED RELEASE ORAL at 08:46

## 2021-01-06 RX ADMIN — METOPROLOL TARTRATE SCH MG: 25 TABLET, FILM COATED ORAL at 08:45

## 2021-01-06 RX ADMIN — Medication SCH MG: at 08:46

## 2021-01-06 RX ADMIN — TROSPIUM CHLORIDE SCH MG: 20 TABLET, FILM COATED ORAL at 08:45

## 2021-01-06 RX ADMIN — ENOXAPARIN SODIUM SCH MG: 80 INJECTION SUBCUTANEOUS at 08:45

## 2021-01-06 RX ADMIN — ASPIRIN 81 MG CHEWABLE TABLET SCH MG: 81 TABLET CHEWABLE at 08:46

## 2021-01-06 NOTE — P.PN
Subjective


Progress Note Date: 01/06/21





CHIEF COMPLAINT:  Palpitations





HISTORY OF PRESENT ILLNESS:  





1/5/2021


This is a 85-year-old male with a past medical history significant for 

hypertension, hyperlipidemia, and atrial fibrillation on anticoagulation with 

Coumadin. Patient follows in the office with Dr. Cast. We have been asked to 

see the patient in consultation for atrial fib.  Patient examined this morning 

at the bedside in the emergency room.  Patient states he began having 

palpitations yesterday which prompted him to come to the emergency room for 

evaluation.  He denies chest pain or pressure.  He denies shortness of breath.  

Denies nausea or vomiting.  Denies dizziness or lightheadedness.  It is noted 

the patient was recently hospitalized at [facility] and December 2020 secondary to pneumonia and age of fibrillation.  The patient was on 

mechanical ventilation at that time.  The patient states he was "put back into a

normal rhythm" by Dr. Zambrano during that hospitalization. No records are 

available at this time for review.





1/6/2021


Patient examined this morning at the bedside. He denies chest pain or pressure. 

Denies shortness of breath. INR 1.6.  He is on Coumadin and Lovenox.  Heart rate

in the low 100s.  Echocardiogram completed revealed ejection fraction 40-45%.





PHYSICAL EXAM: 


VITAL SIGNS: Reviewed.


GENERAL: Well-developed in no acute distress. 


HEENT: Head is normocephalic. Pupils are equal, round. Sclerae anicteric. Mucous

membranes of the mouth are moist. Neck supple. No JVD or thyromegaly


LUNGS: Respirations even and unlabored. Lungs diminished bilaterally


HEART: Irregular rate and rhythm.  S1 and S2 heard.  Systolic murmur noted.


ABDOMEN: Soft. Nondistended. Nontender.


EXTREMITIES: Normal range of motion.  No clubbing or cyanosis.  Peripheral 

pulses intact.  No lower extremity edema


NEUROLOGIC: Awake and alert. Oriented x 3. 





ASSESSMENT: 


Palpitations


Paroxysmal atrial fibrillation on anticoagulation with Coumadin


Subtherapeutic INR


Recent hospitalization secondary to pneumonia requiring mechanical ventilation


Hypertension


Hyperlipidemia





PLAN: 


Increase metoprolol to 50 mg twice a day


Decrease Cardizem to 120 mg daily


Patient may be discharged home today from a cardiac standpoint.  He is to 

continue with Lovenox injections for the next 2 days.  Continue current dose of 

Coumadin.  Patient to have his INR rechecked on Saturday.





Nurse practitioner note has been reviewed by physician. Signing provider agrees 

with the documented findings, assessment, and plan of care. 











Objective





- Vital Signs


Vital signs: 


                                   Vital Signs











Temp  98.0 F   01/06/21 08:37


 


Pulse  108 H  01/06/21 08:37


 


Resp  16   01/06/21 08:37


 


BP  142/94   01/06/21 08:37


 


Pulse Ox  88 L  01/06/21 08:37








                                 Intake & Output











 01/05/21 01/06/21 01/06/21





 18:59 06:59 18:59


 


Intake Total 300  


 


Output Total 750 750 


 


Balance -450 -750 


 


Weight 77.111 kg  


 


Intake:   


 


  Oral 300  


 


Output:   


 


  Urine 750 750 


 


Other:   


 


  Voiding Method Indwelling Catheter Indwelling Catheter Indwelling Catheter


 


  # Voids 1  














- Labs


CBC & Chem 7: 


                                 01/05/21 06:33





                                 01/05/21 06:33


Labs: 


                  Abnormal Lab Results - Last 24 Hours (Table)











  01/06/21 Range/Units





  07:18 


 


PT  15.9 H  (9.0-12.0)  sec


 


INR  1.6 H  (<1.2)

## 2021-01-07 NOTE — P.DS
Providers


Date of admission: 


01/04/21 15:39





Expected date of discharge: 01/06/21


Attending physician: 


Robert Seo





Consults: 





                                        





01/04/21 15:40


Consult Physician Urgent 


   Consulting Provider: [facility]


   Consult Reason/Comments: pafib, subtheraputic inr


   Do you want consulting provider notified?: Yes











Primary care physician: 


[facility]





[facility] Course: 





Chief Complaint: Dizzy with palpitations





History of presenting complaint:


This is a 85-year-old patient of Dr. Nick Crawford.  Chronic stable medical 

conditions include abdominal aortic aneurysm 4.7 cm being followed by Dr. Mcnulty from vascular, hyperlipidemia, essential hypertension, coronary artery 

disease with prior MI cognitive impairment from late-onset Alzheimer's dementia.


Patient now presents with 1 day history of significant palpitations.  Short of 

breath.  Unable to walk.  Dizzy.  Heart it is better control by the time he was 

in the ER.  Cardiology was consulted.  Apparently patient was cardioverted at 

[facility] recently.  Denies any chest pain.  Feeling better this

morning.


Today-patient feeling much better.  Back to his baseline.  No cardiac symptoms. 

Medication adjusted.  Cleared by cardiology.  He will receive 2 days of 

overlapping Lovenox





Consultation:


[facility]





Physical examination:


VITAL SIGNS: 98.1, 76, 18, 01/21/1995, 96% room air


GENERAL: BMI 27.4, laying in bed, comfortable.


EYES: Pupils equal.  Conjunctiva normal.


HEENT: External appearance of nose and ears normal, oral cavity grossly normal.


NECK: JVD not raised; masses not palpable.


HEART: Irregular heart sounds;  no edema.  


LUNGS: Respiratory rate normal; decreased breath sounds.  


ABDOMEN: Soft,  nontender, liver spleen not palpable, no masses palpable.  


PSYCH: Able tonsil simple questions.  








INVESTIGATIONS, reviewed in the clinical context:


January 6: INR 1.6


2-D echocardiogram-EF 40-45%.  Severe mitral regurgitant


White count 8.8 hemoglobin 12.5 platelets 358 potassium 5.1 bun 30 creatinine 

1.48


EKG tracing personally reviewed by me-atrial fibrillation rate controlled


Computed tomography scan of the brain-chronic changes with moderate diffuse ce

rebral atrophy


Chest x-ray film-personally reviewed by me: some scattered interstitial density





Assessment:


-Persistent atrial fibrillation with an episode of rapid ventricle rate 

symptomatic uncontrolled


-Chronic kidney disease stage III likely nephrosclerosis


-Hyperkalemia , on presentation


-Abdominal aortic aneurysm a 4.7 cm-follow with Dr. Partida vascular


-Hyperlipidemia


-Essential hypertension


-Coronary artery disease with prior MI


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


-Chronic congestive heart failure from systolic dysfunction EF 40-45%


-Severe mitral regurgitation





Disposition:


Home





Patient Condition at Discharge: Stable





Plan - Discharge Summary


Discharge Rx Participant: No


New Discharge Prescriptions: 


New


   Diltiazem Cd [Cardizem CD] 120 mg PO DAILY #30 cap.er.24h


   Warfarin [Coumadin] 4 mg PO ONCE@1800 #60 tab


   Metoprolol Tartrate [Lopressor] 50 mg PO BID #60 tab


   Enoxaparin [Lovenox] 80 mg SQ BID #4 syringe


   Atorvastatin [Lipitor] 40 mg PO HS #30 tab





Continue


   Multivitamin [Men's Multi-Vitamin] 1 tab PO DAILY


   Aspirin EC [Ecotrin Low Dose] 81 mg PO DAILY


   Acetaminophen [Tylenol] 1,000 mg PO Q8H PRN


     PRN Reason: Moderate Pain


   Budesonide [Pulmicort] 1 mg INHALATION RT-BID@0700,1900


   Ipratropium-Albuterol Nebulize [Duoneb 0.5 mg-3 mg/3 ml Soln] 3 ml INHALATION

RT-QID PRN


     PRN Reason: Shortness Of Breath


   Latanoprost/Pf [Latanoprost 0.005% Eye Drop] 1 drop BOTH EYES HS


   Magnesium Oxide [Magox 400] 400 mg PO DAILY


   Omeprazole [PriLOSEC] 20 mg PO BID


   Trospium Chloride 20 mg PO BID


   Spironolactone [Aldactone] 25 mg PO DAILY #30 tablet


   Melatonin 6 mg PO HS #0


   Cyclobenzaprine [Flexeril] 5 mg PO TID PRN


     PRN Reason: Muscle Spasm


   FLUoxetine HCL [PROzac] 10 mg PO BID





Discontinued


   Metoprolol Tartrate [Lopressor] 25 mg PO BID


   Diltiazem Cd [Cardizem CD] 180 mg PO DAILY


   Meloxicam 15 mg PO DAILY


   Warfarin [Coumadin] 2 mg PO HS #0


   Atorvastatin [Lipitor] 40 mg PO HS


   lisinopriL 20 mg PO HS


Discharge Medication List





Aspirin EC [Ecotrin Low Dose] 81 mg PO DAILY 12/28/16 [History]


Multivitamin [Men's Multi-Vitamin] 1 tab PO DAILY 12/28/16 [History]


Acetaminophen [Tylenol] 1,000 mg PO Q8H PRN 12/16/20 [History]


Budesonide [Pulmicort] 1 mg INHALATION RT-BID@0700,1900 12/16/20 [History]


Ipratropium-Albuterol Nebulize [Duoneb 0.5 mg-3 mg/3 ml Soln] 3 ml INHALATION 

RT-QID PRN 12/16/20 [History]


Latanoprost/Pf [Latanoprost 0.005% Eye Drop] 1 drop BOTH EYES HS 12/16/20 

[History]


Magnesium Oxide [Magox 400] 400 mg PO DAILY 12/16/20 [History]


Omeprazole [PriLOSEC] 20 mg PO BID 12/16/20 [History]


Trospium Chloride 20 mg PO BID 12/16/20 [History]


Melatonin 6 mg PO HS #0 12/18/20 [Rx]


Spironolactone [Aldactone] 25 mg PO DAILY #30 tablet 12/18/20 [Rx]


Cyclobenzaprine [Flexeril] 5 mg PO TID PRN 01/04/21 [History]


FLUoxetine HCL [PROzac] 10 mg PO BID 01/04/21 [History]


Atorvastatin [Lipitor] 40 mg PO HS #30 tab 01/06/21 [Rx]


Diltiazem Cd [Cardizem CD] 120 mg PO DAILY #30 cap.er.24h 01/06/21 [Rx]


Enoxaparin [Lovenox] 80 mg SQ BID #4 syringe 01/06/21 [Rx]


Metoprolol Tartrate [Lopressor] 50 mg PO BID #60 tab 01/06/21 [Rx]


Warfarin [Coumadin] 4 mg PO ONCE@1800 #60 tab 01/06/21 [Rx]








Follow up Appointment(s)/Referral(s): 


cardiology, [Other] - 2 Weeks


Nick Crawford MD [Primary Care Provider] - 1-2 days


Activity/Diet/Wound Care/Special Instructions: 


bmp - 5 days


Discharge Disposition: HOME SELF-CARE